# Patient Record
Sex: FEMALE | Race: WHITE | NOT HISPANIC OR LATINO | Employment: FULL TIME | URBAN - METROPOLITAN AREA
[De-identification: names, ages, dates, MRNs, and addresses within clinical notes are randomized per-mention and may not be internally consistent; named-entity substitution may affect disease eponyms.]

---

## 2022-08-09 ENCOUNTER — OFFICE VISIT (OUTPATIENT)
Dept: PHYSICAL THERAPY | Facility: CLINIC | Age: 57
End: 2022-08-09
Payer: COMMERCIAL

## 2022-08-09 DIAGNOSIS — G89.29 CHRONIC LEFT SHOULDER PAIN: ICD-10-CM

## 2022-08-09 DIAGNOSIS — M54.2 NECK PAIN: Primary | ICD-10-CM

## 2022-08-09 DIAGNOSIS — G89.29 CHRONIC RIGHT SHOULDER PAIN: ICD-10-CM

## 2022-08-09 DIAGNOSIS — M25.512 CHRONIC LEFT SHOULDER PAIN: ICD-10-CM

## 2022-08-09 DIAGNOSIS — G89.29 CHRONIC JAW PAIN: ICD-10-CM

## 2022-08-09 DIAGNOSIS — R68.84 CHRONIC JAW PAIN: ICD-10-CM

## 2022-08-09 DIAGNOSIS — M25.511 CHRONIC RIGHT SHOULDER PAIN: ICD-10-CM

## 2022-08-09 PROCEDURE — 97161 PT EVAL LOW COMPLEX 20 MIN: CPT

## 2022-08-09 PROCEDURE — 97140 MANUAL THERAPY 1/> REGIONS: CPT

## 2022-08-09 NOTE — LETTER
2022    Bryant Briceno DMD  [address] [place]    Patient: Jayme Rowell   YOB: 1965   Date of Visit: 2022     Encounter Diagnosis     ICD-10-CM    1  Neck pain  M54 2    2  Chronic left shoulder pain  M25 512     G89 29    3  Chronic right shoulder pain  M25 511     G89 29    4  Chronic jaw pain  R68 84     G89 29        Dear Dr Abundio Main: Thank you for your recent referral of [facility]  Please review the attached evaluation summary from [facility] recent visit  Please verify that you agree with the plan of care by signing the attached order  If you have any questions or concerns, please do not hesitate to call  I sincerely appreciate the opportunity to share in the care of one of your patients and hope to have another opportunity to work with you in the near future  Sincerely,    Noemí Jones, PT      Referring Provider:      I certify that I have read the below Plan of Care and certify the need for these services furnished under this plan of treatment while under my care  CADY Cohen [id number]  [address]          PT Evaluation     Today's date: 2022  Patient name: Jayme Rowell  : 1965  MRN: [id number]  Referring provider: Shlomo Interiano PT  Dx:   Encounter Diagnosis     ICD-10-CM    1  Neck pain  M54 2    2  Chronic left shoulder pain  M25 512     G89 29    3  Chronic right shoulder pain  M25 511     G89 29    4  Chronic jaw pain  R68 84     G89 29        Start Time: 0800  Stop Time: 0900  Total time in clinic (min): 60 minutes    Assessment  Assessment details: Patient is a 58 yo female presenting to her physical therapy direct access evaluation with symptoms consistent with chronic neck, shoulder and TMJ pain that started increase a few weeks ago   Patient presents with decreased cervical and TMJ ROM, decreased strength on the L UE and decreased activity tolerance  Patient has increased pain with eating, talking drive and lifting  PT will address the noted impairments by performing scapular and shoulder strengthening, stretching, balance, functional activities and manual techniques to allow the patient to return to her PLOF  PT recommended 2x/week for 4-6 weeks c a guarded prognosis 2* PLOF  Ivin Moment, SPT was present during the session  Patient provided verbal consent  Impairments: abnormal or restricted ROM, activity intolerance, impaired physical strength, lacks appropriate home exercise program, pain with function, poor posture  and poor body mechanics    Symptom irritability: moderateUnderstanding of Dx/Px/POC: good   Prognosis: good    Goals  STG: In four weeks the patient will:    1  Be (I) with her HEP  2  Increase L shoulder strength to 4+/5 MMT score to assist c ADLs  3  Increase cervical ROM by 25% to assist c driving  4  Increase mandibular opening by 10 with < 2/10 pain in the jaw  LTG: In six weeks, the patient will:    1  Increase FOTO score to 51 to demonstrate improvements in symptoms and function  2  Demonstrate full cervical AROM without pain  3  Open her mouth with < 2/10 pain in the jaw without pain  4  Increase UE strength to 5/5 MMT score to assist c prolonged activities  5  Talk with < 2/10 pain in the jaw  6  Drive with minimal pain  7  Eat a sandwich <2/10 pain in the jaw         Plan  Patient would benefit from: skilled physical therapy and PT eval  Referral necessary: Yes  Planned modality interventions: cryotherapy and thermotherapy: hydrocollator packs  Planned therapy interventions: abdominal trunk stabilization, joint mobilization, manual therapy, massage, Weber taping, balance, body mechanics training, breathing training, neuromuscular re-education, patient education, postural training, strengthening, stretching, therapeutic activities, therapeutic exercise, transfer training, home exercise program, functional ROM exercises and flexibility  Frequency: 2x week  Duration in visits: 12  Duration in weeks: 6  Plan of Care beginning date: 2022  Plan of Care expiration date: 2022  Treatment plan discussed with: patient Shweta Pressley SPT was present during the session  )        Subjective Evaluation    History of Present Illness  Mechanism of injury: Patient noted that she has clicking in both TMJ joints  Patient noted that in the past couple of months she started to notice increased pain  Patient feels that she has a constant ear ache on the L > R  Patient noted no paraesthesias  Patient went to the dentist about two weeks ago  Patient is going to get a device fitted tomorrow and will wear it 4-6 months even when she eats  Patient will then transition to a   Patient noted that her pain is constant and is hard to fall asleep  Patient is taking advil on a daily basis  Patient is able to eat everything, however she has a hard time with hard food  Patient noted that she is talking different and would have her jaw lock when opening  Patient noted that she has neck pain and shoulder pain (L > R)  Patient has increased difficulty with driving  Patient stands for a majority of the day               Recurrent probem    Quality of life: good    Pain  Current pain ratin  At best pain rating: 3  At worst pain ratin  Location: TMJ, shoulder and neck (L >R)  Quality: dull ache  Relieving factors: medications, heat and rest  Aggravating factors: standing, lifting, overhead activity and eating  Progression: worsening    Social Support  Steps to enter house: yes  1  Stairs in house: yes   Lives in: multiple-level home  Lives with: significant other and adult children    Employment status: working  Hand dominance: right  Exercise history: walking      Diagnostic Tests  X-ray: normal (cervical spine)  MRI studies: normal (cervical spine)  Patient Goals  Patient goals for therapy: increased strength, independence with ADLs/IADLs, return to sport/leisure activities, increased motion and decreased pain  Patient goal: "to decrease pain with talking and eating " "to turn her head with driving"        Objective     Palpation   Left   Hypertonic in the masseter  Tenderness of the suboccipitals and upper trapezius  Trigger point to lateral pterygoid and medial pterygoid  Right   Hypertonic in the masseter  Tenderness of the suboccipitals and upper trapezius  Trigger point to lateral pterygoid and medial pterygoid       Active Range of Motion   Cervical/Thoracic Spine       Cervical    Subcranial retraction:  with pain   Restriction level: moderate  Flexion:  Restriction level: minimal  Extension:  Restriction level: minimal  Left lateral flexion:  with pain Restriction level: maximal  Right lateral flexion:  with pain Restriction level maximal  Left rotation:  with pain Restriction level: maximal  Right rotation:  with pain Restriction level: moderate  Left Shoulder   Flexion: WFL  Abduction: WFL  External rotation BTH: T3   Internal rotation BTB: T8     Right Shoulder   Flexion: WFL  Abduction: WFL  External rotation BTH: T3   Internal rotation BTB: T8     Strength/Myotome Testing     Left Shoulder     Planes of Motion   Flexion: 4+   Abduction: 4+   External rotation at 0°: 4   Internal rotation at 0°: 4     Right Shoulder     Planes of Motion   Flexion: 4+   Abduction: 4+   External rotation at 0°: 4+   Internal rotation at 0°: 4+     Left Elbow   Flexion: 4  Extension: 4    Right Elbow   Flexion: 4+  Extension: 4+    Left Wrist/Hand   Wrist extension: 4+  Wrist flexion: 4+    Right Wrist/Hand   Wrist extension: 4+  Wrist flexion: 4+  TMJ   Jaw observations: within normal limits  Joint sounds left: clicking  Joint sounds right: clicking  Lateral bite test, Left: pain on left  Lateral bite test, right: pain on right  ROM: limited  Opening (mm): 43 (30 when talking) and left deviation   Lateral excursion, left (mm): 10 and pain  Lateral excursion, right (mm)t: 5 and pain   Protrusion (mm): 3   ROM comments:  C pattern with clicking and popping      Flowsheet Rows    Flowsheet Row Most Recent Value   PT/OT G-Codes    Current Score 43   Projected Score 51   Assessment Type Evaluation             Precautions: none      Manuals 8/9            Ptyergoid STM (B) MW            Masseter STM MW            Sub-occipital release MW            TMJ distraction MW  Grade  II            TMJ distraction + anterior mobilization MW  Grade  II            Cervical upglides and downglides MW  Grade  III            Neuro Re-Ed             Chin tuck nv            Serratus punch nv            sidelying ER nv            Posture edu nv            4 step TMJ disc recapture x5                                      Ther Ex             pec stretch nv            Open books nv            UT and levator scap stretch nv            Scalene stretch nv            Rows and extensions nv                                                   Ther Activity                                       Gait Training                                       Modalities

## 2022-08-10 ENCOUNTER — OFFICE VISIT (OUTPATIENT)
Dept: PHYSICAL THERAPY | Facility: CLINIC | Age: 57
End: 2022-08-10
Payer: COMMERCIAL

## 2022-08-10 DIAGNOSIS — M25.512 CHRONIC LEFT SHOULDER PAIN: ICD-10-CM

## 2022-08-10 DIAGNOSIS — M54.2 NECK PAIN: Primary | ICD-10-CM

## 2022-08-10 DIAGNOSIS — G89.29 CHRONIC JAW PAIN: ICD-10-CM

## 2022-08-10 DIAGNOSIS — G89.29 CHRONIC LEFT SHOULDER PAIN: ICD-10-CM

## 2022-08-10 DIAGNOSIS — M25.511 CHRONIC RIGHT SHOULDER PAIN: ICD-10-CM

## 2022-08-10 DIAGNOSIS — G89.29 CHRONIC RIGHT SHOULDER PAIN: ICD-10-CM

## 2022-08-10 DIAGNOSIS — R68.84 CHRONIC JAW PAIN: ICD-10-CM

## 2022-08-10 PROCEDURE — 97140 MANUAL THERAPY 1/> REGIONS: CPT

## 2022-08-10 PROCEDURE — 97112 NEUROMUSCULAR REEDUCATION: CPT

## 2022-08-10 NOTE — PROGRESS NOTES
Daily Note     Today's date: 8/10/2022  Patient name: Sommer Roberts  : 1965  MRN: [id number]  Referring provider: Aurelio Medina PT  Dx:   Encounter Diagnosis     ICD-10-CM    1  Neck pain  M54 2    2  Chronic left shoulder pain  M25 512     G89 29    3  Chronic right shoulder pain  M25 511     G89 29    4  Chronic jaw pain  R68 84     G89 29        Start Time: 0815  Stop Time: 0845  Total time in clinic (min): 30 minutes    Subjective: Patient noted that her jaw and neck felt better after last session  Patient noted that she continues to have the popping and clicking in her jaw  Patient noted that she will be fitted for her mouth brace today  Objective: See treatment diary below      Assessment: PT performed STM to the TMJ musculature as well as sub-occipitals to assist c pain and opening of the mandible  Patient noted tenderness, however it was less than last session  PT noted improvements in mandibular opening post manuals and re-location exercise  PT educated the patient on her HEP  Patient would benefit from continued PT to allow the patient to return to her PLOF  Plan: Continue per plan of care        Precautions: none      Manuals 8/9 8/10           Ptyergoid STM (B) MW MW           Masseter STM MW            Sub-occipital release MW MW           TMJ distraction MW  Grade  II MW  Grade  II           TMJ distraction + anterior mobilization MW  Grade  II MW  Grade  II           Cervical upglides and downglides MW  Grade  III            Neuro Re-Ed             Chin tuck nv            Serratus punch nv            sidelying ER nv            Posture edu nv MW           4 step TMJ disc recapture x5 x5                                     Ther Ex             pec stretch nv            Open books nv            UT and levator scap stretch nv            Scalene stretch nv            Rows and extensions nv                                                   Ther Activity Gait Training                                       Modalities             HP  5' beginning

## 2022-08-12 ENCOUNTER — OFFICE VISIT (OUTPATIENT)
Dept: PHYSICAL THERAPY | Facility: CLINIC | Age: 57
End: 2022-08-12
Payer: COMMERCIAL

## 2022-08-12 DIAGNOSIS — G89.29 CHRONIC LEFT SHOULDER PAIN: ICD-10-CM

## 2022-08-12 DIAGNOSIS — R68.84 CHRONIC JAW PAIN: ICD-10-CM

## 2022-08-12 DIAGNOSIS — M25.512 CHRONIC LEFT SHOULDER PAIN: ICD-10-CM

## 2022-08-12 DIAGNOSIS — G89.29 CHRONIC RIGHT SHOULDER PAIN: ICD-10-CM

## 2022-08-12 DIAGNOSIS — G89.29 CHRONIC JAW PAIN: ICD-10-CM

## 2022-08-12 DIAGNOSIS — M25.511 CHRONIC RIGHT SHOULDER PAIN: ICD-10-CM

## 2022-08-12 DIAGNOSIS — M54.2 NECK PAIN: Primary | ICD-10-CM

## 2022-08-12 PROCEDURE — 97140 MANUAL THERAPY 1/> REGIONS: CPT

## 2022-08-12 PROCEDURE — 97112 NEUROMUSCULAR REEDUCATION: CPT

## 2022-08-12 NOTE — PROGRESS NOTES
Daily Note     Today's date: 2022  Patient name: Obie Jade  : 1965  MRN: [id number]  Referring provider: Nieves Smith, PT  Dx:   Encounter Diagnosis     ICD-10-CM    1  Neck pain  M54 2    2  Chronic left shoulder pain  M25 512     G89 29    3  Chronic right shoulder pain  M25 511     G89 29    4  Chronic jaw pain  R68 84     G89 29        Start Time: 0730  Stop Time: 0800  Total time in clinic (min): 30 minutes    Subjective: Patient noted that she felt good after last PT session, however she went to the dentist and she had stim on for 45 mins and noted increased pain  Patient noted since then she has been so tight in the neck and TMJ  Objective: See treatment diary below      Assessment: PT performed STM to the neck, shoulder and TMJ musculature to assist c pain levels  Patient presented with increased trigger points  After manuals were performed, the patient noted a decrease in pain  Patient performed the disc relocation exercise with less clicking as well as the chin tucks with improved mobility  PT educated the patient on her HEP  Patient would benefit from continued PT to allow the patient to return to her PLOF  Plan: Continue per plan of care        Precautions: none      Manuals 8/9 8/10 8/12          Ptyergoid STM (B) MW MW MW          Masseter STM MW  MW          Sub-occipital release MW MW MW          TMJ distraction MW  Grade  II MW  Grade  II MW  Grade  II          TMJ distraction + anterior mobilization MW  Grade  II MW  Grade  II MW  Grade II          Cervical upglides and downglides MW  Grade  III            Neuro Re-Ed             Chin tuck nv  x10  5" hold          Serratus punch nv            sidelying ER nv            Posture edu nv MW MW + HEP edu          4 step TMJ disc recapture x5 x5 x5                                    Ther Ex             pec stretch nv            Open books nv            UT and levator scap stretch nv            Scalene stretch nv Rows and extensions nv                                                   Ther Activity                                       Gait Training                                       Modalities             HP  5' beginning

## 2022-08-22 ENCOUNTER — OFFICE VISIT (OUTPATIENT)
Dept: PHYSICAL THERAPY | Facility: CLINIC | Age: 57
End: 2022-08-22
Payer: COMMERCIAL

## 2022-08-22 DIAGNOSIS — M25.512 CHRONIC LEFT SHOULDER PAIN: ICD-10-CM

## 2022-08-22 DIAGNOSIS — M54.2 NECK PAIN: Primary | ICD-10-CM

## 2022-08-22 DIAGNOSIS — G89.29 CHRONIC LEFT SHOULDER PAIN: ICD-10-CM

## 2022-08-22 DIAGNOSIS — M25.511 CHRONIC RIGHT SHOULDER PAIN: ICD-10-CM

## 2022-08-22 DIAGNOSIS — G89.29 CHRONIC RIGHT SHOULDER PAIN: ICD-10-CM

## 2022-08-22 DIAGNOSIS — G89.29 CHRONIC JAW PAIN: ICD-10-CM

## 2022-08-22 DIAGNOSIS — R68.84 CHRONIC JAW PAIN: ICD-10-CM

## 2022-08-22 PROCEDURE — 97140 MANUAL THERAPY 1/> REGIONS: CPT

## 2022-08-22 PROCEDURE — 97112 NEUROMUSCULAR REEDUCATION: CPT

## 2022-08-22 NOTE — PROGRESS NOTES
Daily Note     Today's date: 2022  Patient name: Bhavana Madrid  : 1965  MRN: [id number]  Referring provider: Tono Cano, PT  Dx:   Encounter Diagnosis     ICD-10-CM    1  Neck pain  M54 2    2  Chronic left shoulder pain  M25 512     G89 29    3  Chronic right shoulder pain  M25 511     G89 29    4  Chronic jaw pain  R68 84     G89 29        Start Time: 0730  Stop Time: 0800  Total time in clinic (min): 30 minutes    Subjective: Patient noted a 3/10 pain in the jaw and a 8/10 pain in the neck at the start of the session  Patient noted that in general she is feeling better and is not eating hard foods  Objective: See treatment diary below      Assessment: PT performed manual techniques to the neck and jaw to assist c pain  PT noted hypomobility on the R when compared to the L side  Patient noted improvements post session  PT educated the patient on stretches and her HEP to assist c pain  Patient would benefit from continued PT to allow the patient to return to her PLOF  Plan: Continue per plan of care        Precautions: none      Manuals 8/9 8/10 8/12 8/22         Ptyergoid STM (B) MW MW MW MW         Masseter STM MW  MW MW         Sub-occipital release MW MW MW MW         TMJ distraction MW  Grade  II MW  Grade  II MW  Grade  II MW  Grade  II         TMJ distraction + anterior mobilization MW  Grade  II MW  Grade  II MW  Grade II MW  Grade II         Cervical upglides and downglides MW  Grade  III            Neuro Re-Ed             Chin tuck nv  x10  5" hold x10  5" hold         Serratus punch nv            sidelying ER nv            Posture edu nv MW MW + HEP edu MW  + HEP edu         4 step TMJ disc recapture x5 x5 x5 x5                                   Ther Ex             pec stretch nv            Open books nv            UT and levator scap stretch nv            Scalene stretch nv            Rows and extensions nv                                                   Ther Activity Gait Training                                       Modalities             HP  5' beginning

## 2022-08-26 ENCOUNTER — OFFICE VISIT (OUTPATIENT)
Dept: PHYSICAL THERAPY | Facility: CLINIC | Age: 57
End: 2022-08-26
Payer: COMMERCIAL

## 2022-08-26 DIAGNOSIS — M54.2 NECK PAIN: Primary | ICD-10-CM

## 2022-08-26 DIAGNOSIS — G89.29 CHRONIC LEFT SHOULDER PAIN: ICD-10-CM

## 2022-08-26 DIAGNOSIS — G89.29 CHRONIC RIGHT SHOULDER PAIN: ICD-10-CM

## 2022-08-26 DIAGNOSIS — M25.511 CHRONIC RIGHT SHOULDER PAIN: ICD-10-CM

## 2022-08-26 DIAGNOSIS — R68.84 CHRONIC JAW PAIN: ICD-10-CM

## 2022-08-26 DIAGNOSIS — G89.29 CHRONIC JAW PAIN: ICD-10-CM

## 2022-08-26 DIAGNOSIS — M25.512 CHRONIC LEFT SHOULDER PAIN: ICD-10-CM

## 2022-08-26 PROCEDURE — 97112 NEUROMUSCULAR REEDUCATION: CPT

## 2022-08-26 PROCEDURE — 97140 MANUAL THERAPY 1/> REGIONS: CPT

## 2022-08-26 NOTE — PROGRESS NOTES
Daily Note     Today's date: 2022  Patient name: Rishi Castañeda  : 1965  MRN: [id number]  Referring provider: Stephanie Brooke DMD  Dx:   Encounter Diagnosis     ICD-10-CM    1  Neck pain  M54 2    2  Chronic left shoulder pain  M25 512     G89 29    3  Chronic right shoulder pain  M25 511     G89 29    4  Chronic jaw pain  R68 84     G89 29        Start Time: 730  Stop Time: 805  Total time in clinic (min): 35 minutes    Subjective: Patient noted that she had 8/10 pain at the start of the session due to not sleeping the past few days  Patient noted she felt good after last session  Objective: See treatment diary below      Assessment: Patient presented with moderate trigger points and muscle tension at the start of the session  Patient had more tenderness on the L when compared to the R  PT performed STM to the cervical, TMJ and shoulder musculature to assist c pain  Patient noted some improvements post manuals  Patient performed various strengthening exercises to assist c stabilization  Patient noted fatigue  PT added to her HEP as well as educated the patient on sleeping and drinking more water to assist c pain  Patient would benefit from continued PT to allow the patient to return to her PLOF  Plan: Continue per plan of care        Precautions: none  [employer] ACCESS CODE: [id number]       Manuals 8/9 8/10 8/12 8/22 8/26        Ptyergoid STM (B) MW MW MW MW MW        Masseter STM MW  MW MW MW        Sub-occipital release MW MW MW MW MW        TMJ distraction MW  Grade  II MW  Grade  II MW  Grade  II MW  Grade  II MW  Grade  III        TMJ distraction + anterior mobilization MW  Grade  II MW  Grade  II MW  Grade II MW  Grade II MW  Grade  III        Cervical upglides and downglides MW  Grade  III            Neuro Re-Ed             Chin tuck nv  x10  5" hold x10  5" hold x10  5" hold        Serratus punch nv    x10 ea        sidelying ER nv    x10 ea        Posture edu nv MW MW + HEP edu MW  + HEP edu HEP edu  MW        4 step TMJ disc recapture x5 x5 x5 x5 x5                                  Ther Ex             pec stretch nv            Open books nv            UT and levator scap stretch nv            Scalene stretch nv            Rows and extensions nv                                                   Ther Activity                                       Gait Training                                       Modalities             HP  5' beginning

## 2022-08-29 ENCOUNTER — OFFICE VISIT (OUTPATIENT)
Dept: PHYSICAL THERAPY | Facility: CLINIC | Age: 57
End: 2022-08-29
Payer: COMMERCIAL

## 2022-08-29 DIAGNOSIS — G89.29 CHRONIC LEFT SHOULDER PAIN: ICD-10-CM

## 2022-08-29 DIAGNOSIS — G89.29 CHRONIC RIGHT SHOULDER PAIN: ICD-10-CM

## 2022-08-29 DIAGNOSIS — M25.512 CHRONIC LEFT SHOULDER PAIN: ICD-10-CM

## 2022-08-29 DIAGNOSIS — M25.511 CHRONIC RIGHT SHOULDER PAIN: ICD-10-CM

## 2022-08-29 DIAGNOSIS — M54.2 NECK PAIN: Primary | ICD-10-CM

## 2022-08-29 DIAGNOSIS — R68.84 CHRONIC JAW PAIN: ICD-10-CM

## 2022-08-29 DIAGNOSIS — G89.29 CHRONIC JAW PAIN: ICD-10-CM

## 2022-08-29 PROCEDURE — 97110 THERAPEUTIC EXERCISES: CPT

## 2022-08-29 PROCEDURE — 97112 NEUROMUSCULAR REEDUCATION: CPT

## 2022-08-29 PROCEDURE — 97140 MANUAL THERAPY 1/> REGIONS: CPT

## 2022-08-29 NOTE — PROGRESS NOTES
Daily Note     Today's date: 2022  Patient name: Jayme Rowell  : 1965  MRN: [id number]  Referring provider: Kaylynn López DMD  Dx:   Encounter Diagnosis     ICD-10-CM    1  Neck pain  M54 2    2  Chronic left shoulder pain  M25 512     G89 29    3  Chronic right shoulder pain  M25 511     G89 29    4  Chronic jaw pain  R68 84     G89 29        Start Time: 0730  Stop Time: 0800  Total time in clinic (min): 30 minutes    Subjective: Patient noted that she is in a 8/10 pain in the neck at the start of the session  Patient noted that the jaw pain has decreased a little  Objective: See treatment diary below      Assessment: Patient felt relief with cervical traction as well as manual techniques  Patient noted a decrease in pain at the end of the session  PT introduced various stretches to assist c pain  Patient required min VCs for form  PT added to her HEP  Patient would benefit from continued PT to allow the patient to return to her PLOF  Plan: Continue per plan of care        Precautions: none  [email or web address] ACCESS CODE: [id number]       Manuals 8/9 8/10 8/12 8/22 8/26 8/29       Ptyergoid STM (B) MW MW MW MW MW        Masseter STM MW  MW MW MW        Sub-occipital release MW MW MW MW MW MW       TMJ distraction MW  Grade  II MW  Grade  II MW  Grade  II MW  Grade  II MW  Grade  III        TMJ distraction + anterior mobilization MW  Grade  II MW  Grade  II MW  Grade II MW  Grade II MW  Grade  III        Cervical distraction      MW  Grade  III       First rib mobilization      MW grade  III       Cervical upglides and downglides MW  Grade  III     MW  Grade  III       Neuro Re-Ed             Chin tuck nv  x10  5" hold x10  5" hold x10  5" hold x15  5" hold       Serratus punch nv    x10 ea x20       sidelying ER nv    x10 ea x20 ea       Posture edu nv MW MW + HEP edu MW  + HEP edu HEP edu  MW HEP edu  MW       4 step TMJ disc recapture x5 x5 x5 x5 x5                                  Ther Ex             pec stretch nv            Open books nv            UT and levator scap stretch nv     UT  3x30" ea       Scalene stretch nv     Tried  pain       Rows and extensions nv            Self first rib mobilization      x10 ea with towel                                 Ther Activity                                       Gait Training                                       Modalities             HP  5' beginning

## 2022-08-31 ENCOUNTER — OFFICE VISIT (OUTPATIENT)
Dept: PHYSICAL THERAPY | Facility: CLINIC | Age: 57
End: 2022-08-31
Payer: COMMERCIAL

## 2022-08-31 DIAGNOSIS — M25.512 CHRONIC LEFT SHOULDER PAIN: ICD-10-CM

## 2022-08-31 DIAGNOSIS — R68.84 CHRONIC JAW PAIN: ICD-10-CM

## 2022-08-31 DIAGNOSIS — G89.29 CHRONIC RIGHT SHOULDER PAIN: ICD-10-CM

## 2022-08-31 DIAGNOSIS — M25.511 CHRONIC RIGHT SHOULDER PAIN: ICD-10-CM

## 2022-08-31 DIAGNOSIS — G89.29 CHRONIC LEFT SHOULDER PAIN: ICD-10-CM

## 2022-08-31 DIAGNOSIS — M54.2 NECK PAIN: Primary | ICD-10-CM

## 2022-08-31 DIAGNOSIS — G89.29 CHRONIC JAW PAIN: ICD-10-CM

## 2022-08-31 PROCEDURE — 97110 THERAPEUTIC EXERCISES: CPT

## 2022-08-31 PROCEDURE — 97112 NEUROMUSCULAR REEDUCATION: CPT

## 2022-08-31 PROCEDURE — 97140 MANUAL THERAPY 1/> REGIONS: CPT

## 2022-08-31 NOTE — PROGRESS NOTES
Daily Note     Today's date: 2022  Patient name: Kamari Christopher  : 1965  MRN: [id number]  Referring provider: Jere Mckoy, PT  Dx:   Encounter Diagnosis     ICD-10-CM    1  Neck pain  M54 2    2  Chronic left shoulder pain  M25 512     G89 29    3  Chronic right shoulder pain  M25 511     G89 29    4  Chronic jaw pain  R68 84     G89 29        Start Time: 0730  Stop Time: 0800  Total time in clinic (min): 30 minutes    Subjective: Patient that she is feeling better today than last sessions  Patient noted less pain in the jaw and neck today  Patient noted she is performing her HEP  Objective: See treatment diary below      Assessment: PT introduced the UBE, levator scap stretch and no money exercise to assist c pain  Patient required min VCs for form  Patient noted improvements post exercise and manual techniques  PT educated the patient on her HEP  Patient would benefit from continued PT to allow the patient to return to her PLOF  Plan: Continue per plan of care        Precautions: none  "[employer] ACCESS CODE: [id number]       Manuals 8/9 8/10 8/12 8/22 8/26 8/29 8/31      Ptyergoid STM (B) MW MW MW MW MW        Masseter STM MW  MW MW MW        Sub-occipital release MW MW MW MW MW MW MW + paraspinals      TMJ distraction MW  Grade  II MW  Grade  II MW  Grade  II MW  Grade  II MW  Grade  III        TMJ distraction + anterior mobilization MW  Grade  II MW  Grade  II MW  Grade II MW  Grade II MW  Grade  III        Cervical distraction      MW  Grade  III MW  Grade  III      First rib mobilization      MW grade  III       Cervical upglides and downglides MW  Grade  III     MW  Grade  III       Neuro Re-Ed             Chin tuck nv  x10  5" hold x10  5" hold x10  5" hold x15  5" hold x15  5" hold      Serratus punch nv    x10 ea x20       sidelying ER nv    x10 ea x20 ea       Posture edu nv MW MW + HEP edu MW  + HEP edu HEP edu  MW HEP edu  MW HEP edu  MW      4 step TMJ disc recapture x5 x5 x5 x5 x5        No money       PTB  x10  5" hold                   Ther Ex             UBE retro       5'      pec stretch nv            Open books nv            UT and levator scap stretch nv     UT  3x30" ea + levator scap stretch  3x30" ea      Scalene stretch nv     Tried  pain       Rows and extensions nv            Self first rib mobilization      x10 ea with towel x15 ea                                Ther Activity                                       Gait Training                                       Modalities             HP  5' beginning

## 2022-09-06 ENCOUNTER — OFFICE VISIT (OUTPATIENT)
Dept: PHYSICAL THERAPY | Facility: CLINIC | Age: 57
End: 2022-09-06
Payer: COMMERCIAL

## 2022-09-06 DIAGNOSIS — G89.29 CHRONIC RIGHT SHOULDER PAIN: ICD-10-CM

## 2022-09-06 DIAGNOSIS — G89.29 CHRONIC JAW PAIN: ICD-10-CM

## 2022-09-06 DIAGNOSIS — M25.511 CHRONIC RIGHT SHOULDER PAIN: ICD-10-CM

## 2022-09-06 DIAGNOSIS — R68.84 CHRONIC JAW PAIN: ICD-10-CM

## 2022-09-06 DIAGNOSIS — G89.29 CHRONIC LEFT SHOULDER PAIN: ICD-10-CM

## 2022-09-06 DIAGNOSIS — M54.2 NECK PAIN: Primary | ICD-10-CM

## 2022-09-06 DIAGNOSIS — M25.512 CHRONIC LEFT SHOULDER PAIN: ICD-10-CM

## 2022-09-06 PROCEDURE — 97112 NEUROMUSCULAR REEDUCATION: CPT

## 2022-09-06 PROCEDURE — 97140 MANUAL THERAPY 1/> REGIONS: CPT

## 2022-09-06 PROCEDURE — 97110 THERAPEUTIC EXERCISES: CPT

## 2022-09-06 NOTE — PROGRESS NOTES
Daily Note     Today's date: 2022  Patient name: Adelaida Story  : 1965  MRN: [id number]  Referring provider: Michelle Meade, PT  Dx:   Encounter Diagnosis     ICD-10-CM    1  Neck pain  M54 2    2  Chronic left shoulder pain  M25 512     G89 29    3  Chronic right shoulder pain  M25 511     G89 29    4  Chronic jaw pain  R68 84     G89 29        Start Time: 0730  Stop Time: 0800  Total time in clinic (min): 30 minutes    Subjective: Patient noted that she has an 8/10 of neck pain this morning  Patient noted no jaw pain at the start of the session  Patient noted she is affected by the rain with her pain  Patient noted that she planted about 7 trees yesterday  Objective: See treatment diary below      Assessment: PT performed manual techniques with improved ROM in the cervical spine with up glides and down glides  Patient noted improvements post manuals  Patient required min VCs for form throughout the session  PT educated the patient on performing the self rib mobilization throughout the day to assist c pain  Patient would benefit from continued PT to allow the patient to return to her PLOF  Plan: Continue per plan of care        Precautions: none  [employer] ACCESS CODE: [id number]       Manuals 8/9 8/10 8/12 8/22 8/26 8/29 8/31 9/6     Ptyergoid STM (B) MW MW MW MW MW        Masseter STM MW  MW MW MW        Sub-occipital release MW MW MW MW MW MW MW + paraspinals MW + paraspinals     TMJ distraction MW  Grade  II MW  Grade  II MW  Grade  II MW  Grade  II MW  Grade  III        TMJ distraction + anterior mobilization MW  Grade  II MW  Grade  II MW  Grade II MW  Grade II MW  Grade  III        Cervical distraction      MW  Grade  III MW  Grade  III MW  Grade  III     First rib mobilization      MW grade  III       Cervical upglides and downglides MW  Grade  III     MW  Grade  III       Neuro Re-Ed             Chin tuck nv  x10  5" hold x10  5" hold x10  5" hold x15  5" hold x15  5" hold x15  5" hold     Serratus punch nv    x10 ea x20  x20     sidelying ER nv    x10 ea x20 ea  x20 ea (B) 1#     Posture edu nv MW MW + HEP edu MW  + HEP edu HEP edu  MW HEP edu  MW HEP edu  MW HEP edu  MW     4 step TMJ disc recapture x5 x5 x5 x5 x5        No money       PTB  x10  5" hold                   Ther Ex             UBE retro       5'      pec stretch nv            Open books nv            UT and levator scap stretch nv     UT  3x30" ea + levator scap stretch  3x30" ea      Scalene stretch nv     Tried  pain       Rows and extensions nv            Self first rib mobilization      x10 ea with towel x15 ea x15 ea                               Ther Activity                                       Gait Training                                       Modalities             HP  5' beginning

## 2022-09-08 ENCOUNTER — APPOINTMENT (OUTPATIENT)
Dept: PHYSICAL THERAPY | Facility: CLINIC | Age: 57
End: 2022-09-08
Payer: COMMERCIAL

## 2022-09-09 ENCOUNTER — APPOINTMENT (OUTPATIENT)
Dept: PHYSICAL THERAPY | Facility: CLINIC | Age: 57
End: 2022-09-09
Payer: COMMERCIAL

## 2022-09-13 ENCOUNTER — OFFICE VISIT (OUTPATIENT)
Dept: PHYSICAL THERAPY | Facility: CLINIC | Age: 57
End: 2022-09-13
Payer: COMMERCIAL

## 2022-09-13 DIAGNOSIS — M25.512 CHRONIC LEFT SHOULDER PAIN: ICD-10-CM

## 2022-09-13 DIAGNOSIS — M25.511 CHRONIC RIGHT SHOULDER PAIN: ICD-10-CM

## 2022-09-13 DIAGNOSIS — M54.2 NECK PAIN: Primary | ICD-10-CM

## 2022-09-13 DIAGNOSIS — G89.29 CHRONIC RIGHT SHOULDER PAIN: ICD-10-CM

## 2022-09-13 DIAGNOSIS — R68.84 CHRONIC JAW PAIN: ICD-10-CM

## 2022-09-13 DIAGNOSIS — G89.29 CHRONIC JAW PAIN: ICD-10-CM

## 2022-09-13 DIAGNOSIS — G89.29 CHRONIC LEFT SHOULDER PAIN: ICD-10-CM

## 2022-09-13 PROCEDURE — 97140 MANUAL THERAPY 1/> REGIONS: CPT

## 2022-09-13 NOTE — PROGRESS NOTES
Daily Note     Today's date: 2022  Patient name: Homero Escobedo  : 1965  MRN: [id number]  Referring provider: Anatoliy Longo DMD  Dx:   Encounter Diagnosis     ICD-10-CM    1  Neck pain  M54 2    2  Chronic left shoulder pain  M25 512     G89 29    3  Chronic right shoulder pain  M25 511     G89 29    4  Chronic jaw pain  R68 84     G89 29        Start Time: 0730  Stop Time: 0800  Total time in clinic (min): 30 minutes    Subjective: Patient noted that her neck pain has been not great  Patient noted that her jaw is feeling okay, however sore  Patient noted that she did not receive her TMJ brace  Patient noted she is going on vacation next week  Patient noted that she has a 9-5pm conference call today  Objective: See treatment diary below      Assessment: PT held all neck manuals and exercises due to the patient refusing  PT performed TMJ STM and joint mobilizations to assist c pain  PT noted hypomobility and increased tenderness on the R when compared to the L  Patient noted some improvements post manuals  Patient would benefit from continued PT to allow the patient to return to her PLOF  Plan: Continue per plan of care        Precautions: none  [employer] ACCESS CODE: [id number]       Manuals 8/9 8/10 8/12 8/22 8/26 8/29 8/31 9/6 9/13    Ptyergoid STM (B) MW MW MW MW MW    MW    Masseter STM MW  MW MW MW    MW    Sub-occipital release MW MW MW MW MW MW MW + paraspinals MW + paraspinals     TMJ distraction MW  Grade  II MW  Grade  II MW  Grade  II MW  Grade  II MW  Grade  III    MW  Grade  III    TMJ distraction + anterior mobilization MW  Grade  II MW  Grade  II MW  Grade II MW  Grade II MW  Grade  III    MW  Grade  III    Cervical distraction      MW  Grade  III MW  Grade  III MW  Grade  III     First rib mobilization      MW grade  III       Cervical upglides and downglides MW  Grade  III     MW  Grade  III       Neuro Re-Ed             Chin tuck nv  x10  5" hold x10  5" hold x10  5" hold x15  5" hold x15  5" hold x15  5" hold     Serratus punch nv    x10 ea x20  x20     sidelying ER nv    x10 ea x20 ea  x20 ea (B) 1#     Posture edu nv MW MW + HEP edu MW  + HEP edu HEP edu  MW HEP edu  MW HEP edu  MW HEP edu  MW     4 step TMJ disc recapture x5 x5 x5 x5 x5        No money       PTB  x10  5" hold                   Ther Ex             UBE retro       5'      pec stretch nv            Open books nv            UT and levator scap stretch nv     UT  3x30" ea + levator scap stretch  3x30" ea      Scalene stretch nv     Tried  pain       Rows and extensions nv            Self first rib mobilization      x10 ea with towel x15 ea x15 ea                               Ther Activity                                       Gait Training                                       Modalities             HP  5' beginning

## 2024-10-24 ENCOUNTER — EVALUATION (OUTPATIENT)
Age: 59
End: 2024-10-24
Payer: COMMERCIAL

## 2024-10-24 DIAGNOSIS — H81.12 BENIGN PAROXYSMAL POSITIONAL VERTIGO OF LEFT EAR: ICD-10-CM

## 2024-10-24 DIAGNOSIS — R42 DIZZINESS: Primary | ICD-10-CM

## 2024-10-24 DIAGNOSIS — R51.9 ACUTE NONINTRACTABLE HEADACHE, UNSPECIFIED HEADACHE TYPE: ICD-10-CM

## 2024-10-24 DIAGNOSIS — M54.2 CERVICAL PAIN (NECK): ICD-10-CM

## 2024-10-24 PROCEDURE — 97163 PT EVAL HIGH COMPLEX 45 MIN: CPT

## 2024-10-24 PROCEDURE — 97110 THERAPEUTIC EXERCISES: CPT

## 2024-10-24 PROCEDURE — 97140 MANUAL THERAPY 1/> REGIONS: CPT

## 2024-10-24 NOTE — LETTER
2024    [facility]  [address]     Patient: Pati Nova   YOB: 1965   Date of Visit: 10/24/2024     Encounter Diagnosis     ICD-10-CM    1. Dizziness  R42       2. Benign paroxysmal positional vertigo of left ear  H81.12       3. Cervical pain (neck)  M54.2       4. Acute nonintractable headache, unspecified headache type  R51.9           Dear Dr. Washington:    Thank you for your recent referral of Pati Nova. Please review the attached evaluation summary from Pati's recent visit.     Please verify that you agree with the plan of care by signing the attached order.     If you have any questions or concerns, please do not hesitate to call.     I sincerely appreciate the opportunity to share in the care of one of your patients and hope to have another opportunity to work with you in the near future.       Sincerely,    Wilner Medina, PT      Referring Provider:      I certify that I have read the below Plan of Care and certify the need for these services furnished under this plan of treatment while under my care.                    [facility]  [address]   Via Fax: [phone]          PT Evaluation     Today's date: 10/24/2024  Patient name: Pati Nova  : 1965  MRN: [id number]  Referring provider: Betty Washington CRNP  Dx:   Encounter Diagnosis     ICD-10-CM    1. Dizziness  R42       2. Benign paroxysmal positional vertigo of left ear  H81.12       3. Cervical pain (neck)  M54.2       4. Acute nonintractable headache, unspecified headache type  R51.9           Start Time: 0730  Stop Time: 0830  Total time in clinic (min): 60 minutes    Assessment  Impairments: abnormal coordination, abnormal muscle firing, abnormal muscle tone, abnormal or restricted ROM, activity intolerance, impaired balance, impaired physical strength, lacks appropriate home exercise  program, pain with function and safety issue  Functional limitations: transfers, bed mobility, work duties, bending, lifting, quick movements, cervical mobility  Symptom irritability: high    Assessment details: Pati Nova is a 59 y.o. female presenting with L sided posterior canal BPPV indicative of sygns and symptoms present at initial evaluation.  Positive L Willa-Hallpike was demonstrated - canalith repositioning performed.  Primary impairments include cervical and headache pain with functional activities, deep neck flexor weakness, cervical AROM dysfunction, vestibular dysfunction.  Educated pt on anatomy and physiology of diagnosis.  Will benefit from skilled PT interventions for community reintegration, ADL management/independence, return to work/hobbies.  Provided pt with written home exercise program to be completed daily.    Understanding of Dx/Px/POC: good     Prognosis: good    Goals    Short Term Goals:  In 3 weeks, the patient will:  1. Negative Willa-Hallpike maneuver  2. Bed mobility without onset of dizziness symptoms  3. Supervision with HEP for self-care  4. Decrease dizziness intensity to no more than 3/10    Long Term Goals:  In 6 weeks, the patient will:  1. Complete work duties for 4 consecutive hours without aggravating symptoms  2. FOTO to greater than predicted value  3. Independent with HEP for self-care  4. Decrease dizziness intensity to 0/10    Plan  Patient would benefit from: skilled physical therapy  Planned modality interventions: biofeedback    Planned therapy interventions: abdominal trunk stabilization, activity modification, balance, balance/weight bearing training, behavior modification, body mechanics training, community reintegration, coordination, fine motor coordination training, flexibility, functional ROM exercises, gait training, graded activity, graded exercise, graded motor, home exercise program, work reintegration, therapeutic training, therapeutic exercise,  "therapeutic activities, stretching, strengthening, self care, postural training, patient education, neuromuscular re-education, motor coordination training, massage, manual therapy, joint mobilization, ADL training and canalith repositioning    Frequency: 1-2x week  Duration in weeks: 6  Plan of Care beginning date: 10/24/2024  Plan of Care expiration date: 12/5/2024  Treatment plan discussed with: patient        Subjective    Pain Location: headache/migraines, eyes pain  Pain Intensity: bilateral cervical region - 9/10  Dizziness Intensity: 10/10 vertigo at worst; 7/10 constant dizziness  ANGELA: started with migraines then transitioned to vertigo  DOI: about 1 month ago  Aggravating Factors: getting out of bed, rolling to the left, standing, tilting head back, turning head to fast  Alleviating Factors: medical massage, chiro, klonopin, meclizine  Living Situation:difficulty with ADLs, taking care of dogs, work duties  Constitutional S/S: vertigo, denies paresthesias  Goals: \"to not be dizzy, no vertigo\"  PLOF: works as a  - difficulty concentrating      Objective      Postural Findings:              Head Position x Protracted  Neutral  Retracted   Scapular Position  Protracted x Neutral  Retracted   Thoracic Spine  Inc Kyphosis x Neutral     Lumbar Spine  Inc Lordosis x Neutral  Dec Lordosis   Pelvis  Anterior Tilt x Neutral  Posterior Tilt   Iliac Crest  L elevated x Neutral  R elevated   Scoliotic Curvature  \"C\" Curve  \"S\" Curve     Lateral Shift  Right  Left x None       Range of Motion measurements for the Cervical Spine     Joint Motion Right:  Left:    Flexion 50%*    Extension 25%*    Rotation 50% 50%   Lateral Flexion 50% 50%   Protraction 75%    Retraction 25%    * caused dizziness    UE Myotomes:  Nerve Root Test Action RIGHT  LEFT    C3 Neck side flexion intact intact   C4 Shoulder elevation intact intact   C5 Shoulder abduction intact intact   C6 Elbow Flexion and wrist extension " "intact intact   C7 Elbow extension and wrist flexion intact intact   C8 Thumb extension and ulnar deviation intact intact   T1 Hand intrinsics intact intact       Visual:  Saccades:  Horizontal- slowed, fatigue, soreness; Vertical- slowed, fatigue, soreness  Smooth Pursuits:  slow to follow, eye fatigue and soreness    Vestibular:  Head Thrust (VOR):  worsening of symptoms, no nystagmus observed  [place]-Hallpike (posterior canal):  R- not tested; L- positive, nystagmus (canalith repositioning performed)  Roll Test (horizontal canal):  R/L - not tested         Precautions: BPPV    POC expires Unit limit Auth Expiration date PT/OT + Visit Limit?   12/5/24 BOMN Pending BOMN     Visit/Unit Tracking  AUTH Status:  Date 10/24         Used 1         Remaining             Pertinent Findings:                                                                                            Test / Measure  10/24/2024   DHI 74 (severe handicap)   L Willa-Hallpike +         Manuals 10/24            Canalith Repositioning MM  Left                                                   Neuro Re-Ed             VORx1 - horiz 3x15\"            VOR cx - horiz 3x15\"            Saccades             Pencil pushups                                                    Ther Ex             HEP review / edu 10'            UBE             UT/LS S             Cerv retr 10x3\"                                                                Ther Activity                                       Gait Training                                       Modalities                                                            "

## 2024-10-24 NOTE — PROGRESS NOTES
PT Evaluation     Today's date: 10/24/2024  Patient name: Pati Nova  : 1965  MRN: [id number]  Referring provider: Betty Washington CRNP  Dx:   Encounter Diagnosis     ICD-10-CM    1. Dizziness  R42       2. Benign paroxysmal positional vertigo of left ear  H81.12       3. Cervical pain (neck)  M54.2       4. Acute nonintractable headache, unspecified headache type  R51.9           Start Time: 0730  Stop Time: 08  Total time in clinic (min): 60 minutes    Assessment  Impairments: abnormal coordination, abnormal muscle firing, abnormal muscle tone, abnormal or restricted ROM, activity intolerance, impaired balance, impaired physical strength, lacks appropriate home exercise program, pain with function and safety issue  Functional limitations: transfers, bed mobility, work duties, bending, lifting, quick movements, cervical mobility  Symptom irritability: high    Assessment details: Pati Nova is a 59 y.o. female presenting with L sided posterior canal BPPV indicative of sygns and symptoms present at initial evaluation.  Positive L [place]-Hallpike was demonstrated - canalith repositioning performed.  Primary impairments include cervical and headache pain with functional activities, deep neck flexor weakness, cervical AROM dysfunction, vestibular dysfunction.  Educated pt on anatomy and physiology of diagnosis.  Will benefit from skilled PT interventions for community reintegration, ADL management/independence, return to work/hobbies.  Provided pt with written home exercise program to be completed daily.    Understanding of Dx/Px/POC: good     Prognosis: good    Goals    Short Term Goals:  In 3 weeks, the patient will:  1. Negative [place]-Hallpike maneuver  2. Bed mobility without onset of dizziness symptoms  3. Supervision with HEP for self-care  4. Decrease dizziness intensity to no more than 3/10    Long Term Goals:  In 6 weeks, the patient will:  1. Complete work duties for 4 consecutive hours  "without aggravating symptoms  2. FOTO to greater than predicted value  3. Independent with HEP for self-care  4. Decrease dizziness intensity to 0/10    Plan  Patient would benefit from: skilled physical therapy  Planned modality interventions: biofeedback    Planned therapy interventions: abdominal trunk stabilization, activity modification, balance, balance/weight bearing training, behavior modification, body mechanics training, community reintegration, coordination, fine motor coordination training, flexibility, functional ROM exercises, gait training, graded activity, graded exercise, graded motor, home exercise program, work reintegration, therapeutic training, therapeutic exercise, therapeutic activities, stretching, strengthening, self care, postural training, patient education, neuromuscular re-education, motor coordination training, massage, manual therapy, joint mobilization, ADL training and canalith repositioning    Frequency: 1-2x week  Duration in weeks: 6  Plan of Care beginning date: 10/24/2024  Plan of Care expiration date: 12/5/2024  Treatment plan discussed with: patient        Subjective    Pain Location: headache/migraines, eyes pain  Pain Intensity: bilateral cervical region - 9/10  Dizziness Intensity: 10/10 vertigo at worst; 7/10 constant dizziness  ANGELA: started with migraines then transitioned to vertigo  DOI: about 1 month ago  Aggravating Factors: getting out of bed, rolling to the left, standing, tilting head back, turning head to fast  Alleviating Factors: medical massage, chiro, klonopin, meclizine  Living Situation:difficulty with ADLs, taking care of dogs, work duties  Constitutional S/S: vertigo, denies paresthesias  Goals: \"to not be dizzy, no vertigo\"  PLOF: works as a  - difficulty concentrating      Objective      Postural Findings:              Head Position x Protracted  Neutral  Retracted   Scapular Position  Protracted x Neutral  Retracted   Thoracic Spine " " Inc Kyphosis x Neutral     Lumbar Spine  Inc Lordosis x Neutral  Dec Lordosis   Pelvis  Anterior Tilt x Neutral  Posterior Tilt   Iliac Crest  L elevated x Neutral  R elevated   Scoliotic Curvature  \"C\" Curve  \"S\" Curve     Lateral Shift  Right  Left x None       Range of Motion measurements for the Cervical Spine     Joint Motion Right:  Left:    Flexion 50%*    Extension 25%*    Rotation 50% 50%   Lateral Flexion 50% 50%   Protraction 75%    Retraction 25%    * caused dizziness    UE Myotomes:  Nerve Root Test Action RIGHT  LEFT    C3 Neck side flexion intact intact   C4 Shoulder elevation intact intact   C5 Shoulder abduction intact intact   C6 Elbow Flexion and wrist extension intact intact   C7 Elbow extension and wrist flexion intact intact   C8 Thumb extension and ulnar deviation intact intact   T1 Hand intrinsics intact intact       Visual:  Saccades:  Horizontal- slowed, fatigue, soreness; Vertical- slowed, fatigue, soreness  Smooth Pursuits:  slow to follow, eye fatigue and soreness    Vestibular:  Head Thrust (VOR):  worsening of symptoms, no nystagmus observed  Willa-Hallpike (posterior canal):  R- not tested; L- positive, nystagmus (canalith repositioning performed)  Roll Test (horizontal canal):  R/L - not tested         Precautions: BPPV    POC expires Unit limit Auth Expiration date PT/OT + Visit Limit?   12/5/24 BOMN Pending BOMN     Visit/Unit Tracking  AUTH Status:  Date 10/24         Used 1         Remaining             Pertinent Findings:                                                                                            Test / Measure  10/24/2024   DHI 74 (severe handicap)   L Willa-Hallpike +         Manuals 10/24            Canalith Repositioning MM  Left                                                   Neuro Re-Ed             VORx1 - horiz 3x15\"            VOR cx - horiz 3x15\"            Saccades             Pencil pushups                                                    Ther Ex       " "      HEP review / edu 10'            SENAITE             UT/EBONIE S             Cerv retr 10x3\"                                                                Ther Activity                                       Gait Training                                       Modalities                                            "

## 2024-10-28 ENCOUNTER — OFFICE VISIT (OUTPATIENT)
Age: 59
End: 2024-10-28
Payer: COMMERCIAL

## 2024-10-28 DIAGNOSIS — R42 DIZZINESS: Primary | ICD-10-CM

## 2024-10-28 DIAGNOSIS — R51.9 ACUTE NONINTRACTABLE HEADACHE, UNSPECIFIED HEADACHE TYPE: ICD-10-CM

## 2024-10-28 DIAGNOSIS — H81.12 BENIGN PAROXYSMAL POSITIONAL VERTIGO OF LEFT EAR: ICD-10-CM

## 2024-10-28 DIAGNOSIS — M54.2 CERVICAL PAIN (NECK): ICD-10-CM

## 2024-10-28 PROCEDURE — 97140 MANUAL THERAPY 1/> REGIONS: CPT

## 2024-10-28 PROCEDURE — 97112 NEUROMUSCULAR REEDUCATION: CPT

## 2024-10-28 PROCEDURE — 97110 THERAPEUTIC EXERCISES: CPT

## 2024-10-28 NOTE — PROGRESS NOTES
"Daily Note     Today's date: 10/28/2024  Patient name: Pati Nova  : 1965  MRN: [id number]  Referring provider: Betty Washington CRNP  Dx:   Encounter Diagnosis     ICD-10-CM    1. Dizziness  R42       2. Benign paroxysmal positional vertigo of left ear  H81.12       3. Cervical pain (neck)  M54.2       4. Acute nonintractable headache, unspecified headache type  R51.9           Start Time: 819  Stop Time: 858  Total time in clinic (min): 39 minutes    Subjective: Pt reports an improvement of symptoms since initial evaluation.        Objective: See treatment diary below      Assessment: Tolerated treatment well. Patient would benefit from continued PT.  Pt with negative R [place]-Hallpike.  Positive L Willa-Hallpike - canalith repositioning performed.  Mild cervical discomfort and headache symptoms noted during tx session.  Eye fatigue quickly with VOR exercises.  1:1 with Tommie Medina DPT entirety of tx.        Plan: Continue per plan of care.  Progress treatment as tolerated.       Precautions: BPPV    POC expires Unit limit Auth Expiration date PT/OT + Visit Limit?   24 BOMN Pending BOMN     Visit/Unit Tracking  AUTH Status:  Date 10/24 10/28        Used 1 2        Remaining             Pertinent Findings:                                                                                            Test / Measure  10/24/2024   DHI 74 (severe handicap)   L [place]-Hallpike +         Manuals 10/24 10/28           Canalith Repositioning MM  Left MM Left                                                  Neuro Re-Ed             VORx1 - horiz 3x15\" 3x30\"           VORx1 - vert  3x30\"           VOR cx - horiz 3x15\" 3x30\"           Saccades             Pencil pushups                                                    Ther Ex             HEP review / edu 10'            UBE  3'/3'           UT/LS S  2x30\" ea B           Cerv retr 10x3\" 15x3\"           Shrugs  + rot 2x10 5# dbs           SNAGS - rot  15x3\" B         "                             Ther Activity                                       Gait Training                                       Modalities

## 2024-10-30 ENCOUNTER — OFFICE VISIT (OUTPATIENT)
Age: 59
End: 2024-10-30
Payer: COMMERCIAL

## 2024-10-30 DIAGNOSIS — H81.12 BENIGN PAROXYSMAL POSITIONAL VERTIGO OF LEFT EAR: ICD-10-CM

## 2024-10-30 DIAGNOSIS — R42 DIZZINESS: Primary | ICD-10-CM

## 2024-10-30 DIAGNOSIS — R51.9 ACUTE NONINTRACTABLE HEADACHE, UNSPECIFIED HEADACHE TYPE: ICD-10-CM

## 2024-10-30 DIAGNOSIS — M54.2 CERVICAL PAIN (NECK): ICD-10-CM

## 2024-10-30 PROCEDURE — 97140 MANUAL THERAPY 1/> REGIONS: CPT

## 2024-10-30 PROCEDURE — 97112 NEUROMUSCULAR REEDUCATION: CPT

## 2024-10-30 PROCEDURE — 97110 THERAPEUTIC EXERCISES: CPT

## 2024-10-30 NOTE — PROGRESS NOTES
"Daily Note     Today's date: 10/30/2024  Patient name: Pati Nova  : 1965  MRN: [id number]  Referring provider: Betty Washington CRNP  Dx:   Encounter Diagnosis     ICD-10-CM    1. Dizziness  R42       2. Benign paroxysmal positional vertigo of left ear  H81.12       3. Cervical pain (neck)  M54.2       4. Acute nonintractable headache, unspecified headache type  R51.9           Start Time: 1734  Stop Time: 1800  Total time in clinic (min): 26 minutes    Subjective: Pt reports having a bad dizziness day yesterday.  Great day after PT two days ago.      Objective: See treatment diary below      Assessment: Tolerated treatment well. Patient would benefit from continued PT.  Pt with dynamic balance deficit noted this tx session, especially with head turns.  Moderate eye fatigue and headache symptoms throughout tx session.  No vertiginous symptoms noted.  Negative [place]-Hallpike and Roll Test this visit.  No canalith repositioning performed.  Educated pt on posterior canal and horizontal canal differences.  1:1 with Tommie Medina DPT entirety of tx.        Plan: Continue per plan of care.      Precautions: BPPV    POC expires Unit limit Auth Expiration date PT/OT + Visit Limit?   24 BOMN N/A BOMN     Visit/Unit Tracking  AUTH Status:  Date 10/24 10/28 10/30       Used 1 2 3       Remaining             Pertinent Findings:                                                                                            Test / Measure  10/24/2024   DHI 74 (severe handicap)   L [place]-Hallpike +         Manuals 10/24 10/28 10/30          Canalith Repositioning MM  Left MM Left           [place]-Hallpike MM L MM L MM B          Roll test   MM B                       Neuro Re-Ed             VORx1 - horiz 3x15\" 3x30\" 3x30\"          VORx1 - vert  3x30\" 3x30\"          VORx2 - horiz   3x30\"          VOR cx - horiz 3x15\" 3x30\" 3x30\"          Saccades             Pencil pushups   2x10          Gait + horiz head turns   2 laps     " "                               Ther Ex             HEP review / edu 10'            UBE  3'/3' 3'/3'          UT/LS S  2x30\" yokasta PARKER 2x30\" yokasta PARKER          Cerv retr 10x3\" 15x3\" 15x3\"          Shrugs  + rot 2x10 5# dbs + rot 2x10 10# dbs          SNAGS - rot  15x3\" B 10x10\" B                                    Ther Activity                                       Gait Training                                       Modalities                                              "

## 2024-11-04 ENCOUNTER — OFFICE VISIT (OUTPATIENT)
Age: 59
End: 2024-11-04
Payer: COMMERCIAL

## 2024-11-04 DIAGNOSIS — H81.12 BENIGN PAROXYSMAL POSITIONAL VERTIGO OF LEFT EAR: ICD-10-CM

## 2024-11-04 DIAGNOSIS — R51.9 ACUTE NONINTRACTABLE HEADACHE, UNSPECIFIED HEADACHE TYPE: ICD-10-CM

## 2024-11-04 DIAGNOSIS — M54.2 CERVICAL PAIN (NECK): ICD-10-CM

## 2024-11-04 DIAGNOSIS — R42 DIZZINESS: Primary | ICD-10-CM

## 2024-11-04 PROCEDURE — 97530 THERAPEUTIC ACTIVITIES: CPT

## 2024-11-04 PROCEDURE — 97110 THERAPEUTIC EXERCISES: CPT

## 2024-11-04 PROCEDURE — 97112 NEUROMUSCULAR REEDUCATION: CPT

## 2024-11-04 NOTE — PROGRESS NOTES
"Daily Note     Today's date: 2024  Patient name: Pati Nova  : 1965  MRN: [id number]  Referring provider: Betty Washington CRNP  Dx:   Encounter Diagnosis     ICD-10-CM    1. Dizziness  R42       2. Benign paroxysmal positional vertigo of left ear  H81.12       3. Cervical pain (neck)  M54.2       4. Acute nonintractable headache, unspecified headache type  R51.9           Start Time: 0700  Stop Time: 0745  Total time in clinic (min): 45 minutes    Subjective: Pt reports significant improvement of dizziness symptoms since last visit.  Continues to have cervical and eye soreness/fatigue/pain.        Objective: See treatment diary below      Assessment: Tolerated treatment well. Patient would benefit from continued PT.  Pt able to tolerate progression of planned therapeutic interventions this visit.  Dizziness symptoms much improved.  No need to test vestibular dysfunction this visit.  Discussed eventual transition to POC addressing cervical dysfunction.  Balance dysfunction noted in both static and dynamic balance tasks.  1:1 with Tommie Medina DPT entirety of tx.        Plan: Continue per plan of care.  Progress treatment as tolerated.       Precautions: BPPV    POC expires Unit limit Auth Expiration date PT/OT + Visit Limit?   24 BOMN N/A BOMN     Visit/Unit Tracking  AUTH Status:  Date 10/24 10/28 10/30 11/4      Used 1 2 3 4      Remaining             Pertinent Findings:                                                                                            Test / Measure  10/24/2024   DHI 74 (severe handicap)   L [place]-Hallpike +         Manuals 10/24 10/28 10/30 11/4   Canalith Repositioning MM  Left MM Left     Willa-Hallpike MM L MM L MM B    Roll test   MM B           Neuro Re-Ed       VORx1 - horiz 3x15\" 3x30\" 3x30\" Romberg stance 2x30\"  1x30\" + foam   VORx1 - vert  3x30\" 3x30\" Romberg stance 2x30\"  1x30\" + foam   VORx2 - horiz   3x30\" Romberg stance 3x30\"   VOR cx - horiz 3x15\" 3x30\" " "3x30\" Romberg stance 3x30\"   Saccades       Pencil pushups   2x10    Gait + horiz head turns   2 laps 2 laps   Gait + vert head turns    2 laps          Ther Ex       HEP review / edu 10'             UT/LS S  2x30\" ea B 2x30\" ea B 2x30\" ea B   Cerv retr 10x3\" 15x3\" 15x3\" + ext 15x   Shrugs  + rot 2x10 5# dbs + rot 2x10 10# dbs + rot  10x 10#  10x 15#   SNAGS - rot  15x3\" B 10x10\" B 10x10\" B                 Ther Activity       UBE  3'/3' 3'/3' 3'/3'   [place] carry    2 laps 15# dbs  2 laps + tandem gait 15# dbs   Gait Training                     Modalities                              "

## 2024-11-06 ENCOUNTER — OFFICE VISIT (OUTPATIENT)
Age: 59
End: 2024-11-06
Payer: COMMERCIAL

## 2024-11-06 DIAGNOSIS — H81.12 BENIGN PAROXYSMAL POSITIONAL VERTIGO OF LEFT EAR: ICD-10-CM

## 2024-11-06 DIAGNOSIS — M54.2 CERVICAL PAIN (NECK): ICD-10-CM

## 2024-11-06 DIAGNOSIS — R51.9 ACUTE NONINTRACTABLE HEADACHE, UNSPECIFIED HEADACHE TYPE: ICD-10-CM

## 2024-11-06 DIAGNOSIS — R42 DIZZINESS: Primary | ICD-10-CM

## 2024-11-06 PROCEDURE — 97530 THERAPEUTIC ACTIVITIES: CPT

## 2024-11-06 PROCEDURE — 97140 MANUAL THERAPY 1/> REGIONS: CPT

## 2024-11-06 PROCEDURE — 97110 THERAPEUTIC EXERCISES: CPT

## 2024-11-06 PROCEDURE — 97112 NEUROMUSCULAR REEDUCATION: CPT

## 2024-11-06 NOTE — PROGRESS NOTES
"Daily Note     Today's date: 2024  Patient name: Pati Nova  : 1965  MRN: [id number]  Referring provider: Betty Washington CRNP  Dx:   Encounter Diagnosis     ICD-10-CM    1. Dizziness  R42       2. Benign paroxysmal positional vertigo of left ear  H81.12       3. Cervical pain (neck)  M54.2       4. Acute nonintractable headache, unspecified headache type  R51.9           Start Time: 0700  Stop Time: 0748  Total time in clinic (min): 48 minutes    Subjective: Pt reports no instances of vertiginous symptoms over the past several days.  She does note slight exacerbation of cervical discomfort yesterday and this morning.       Objective: See treatment diary below      Assessment: Tolerated treatment well. Patient would benefit from continued PT.  Tx session focused on cervical dysfunction and balance.  Pt was most challenged with completion of wall angels.  Mild fatigue noted post-session.  No further aggravation of pain symptomology during visit.  R sided posterior cervical region benefits the most from stretching due to tightness.  Pt responded well to introduction of manual therapy.  1:1 with Tommie Medina DPT entirety of tx.        Plan: Continue per plan of care.  Progress treatment as tolerated.       Precautions: BPPV    POC expires Unit limit Auth Expiration date PT/OT + Visit Limit?   24 BOMN N/A BOMN     Visit/Unit Tracking  AUTH Status:  Date 10/24 10/28 10/30 11/4 11/     Used 1 2 3 4 5     Remaining             Pertinent Findings:                                                                                            Test / Measure  10/24/2024   DHI 74 (severe handicap)   L [place]-Hallpike +         Manuals 10/24 10/28 10/30 11/4 11/6   Canalith Repositioning MM  Left MM Left      [place]-Hallpike MM L MM L MM B     Roll test   MM B     C/s STM, SOR, distr, UT S     MM 8'   Neuro Re-Ed        VORx1 - horiz 3x15\" 3x30\" 3x30\" Romberg stance 2x30\"  1x30\" + foam    VORx1 - vert  3x30\" " "3x30\" Romberg stance 2x30\"  1x30\" + foam    VORx2 - horiz   3x30\" Romberg stance 3x30\"    VOR cx - horiz 3x15\" 3x30\" 3x30\" Romberg stance 3x30\"    Saccades        Pencil pushups   2x10     Gait + horiz head turns   2 laps 2 laps    Gait + vert head turns    2 laps    SLS     3x30\" B blue pad   DNF supine     20x3\"   Wall angels     15x3\"   Ther Ex        HEP review / edu 10'               UT/LS S  2x30\" ea B 2x30\" ea B 2x30\" ea B 2x30\" ea B on PB   Cerv retr 10x3\" 15x3\" 15x3\" + ext 15x + ext 2x10 on PB   Shrugs  + rot 2x10 5# dbs + rot 2x10 10# dbs + rot  10x 10#  10x 15# + rot 2x10 15#   SNAGS - rot  15x3\" B 10x10\" B 10x10\" B    [place] rows     2x10 13#   [place] Sh ext     2x10 12#   Ther Activity        UBE  3'/3' 3'/3' 3'/3' 3'/3'   [place] carry    2 laps 15# dbs  2 laps + tandem gait 15# dbs 2 laps 15# dbs + tandem gait   Gait Training                        Modalities                                   "

## 2024-11-11 ENCOUNTER — OFFICE VISIT (OUTPATIENT)
Age: 59
End: 2024-11-11
Payer: COMMERCIAL

## 2024-11-11 DIAGNOSIS — M54.2 CERVICAL PAIN (NECK): ICD-10-CM

## 2024-11-11 DIAGNOSIS — R51.9 ACUTE NONINTRACTABLE HEADACHE, UNSPECIFIED HEADACHE TYPE: ICD-10-CM

## 2024-11-11 DIAGNOSIS — H81.12 BENIGN PAROXYSMAL POSITIONAL VERTIGO OF LEFT EAR: ICD-10-CM

## 2024-11-11 DIAGNOSIS — R42 DIZZINESS: Primary | ICD-10-CM

## 2024-11-11 PROCEDURE — 97112 NEUROMUSCULAR REEDUCATION: CPT

## 2024-11-11 PROCEDURE — 97110 THERAPEUTIC EXERCISES: CPT

## 2024-11-11 NOTE — PROGRESS NOTES
"Daily Note     Today's date: 2024  Patient name: Pati Nova  : 1965  MRN: [id number]  Referring provider: Betty Washington CRNP  Dx:   Encounter Diagnosis     ICD-10-CM    1. Dizziness  R42       2. Benign paroxysmal positional vertigo of left ear  H81.12       3. Cervical pain (neck)  M54.2       4. Acute nonintractable headache, unspecified headache type  R51.9           Start Time: 0700  Stop Time: 0716  Total time in clinic (min): 16 minutes    Subjective: Pt reports no vertiginous symptoms since last visit.  She also notes an improvement of cervical pain.  Compliant with HEP.      Objective: See treatment diary below      Assessment: Tolerated treatment well. Patient would benefit from continued PT.  Continued to address cervical dysfunction secondary to vertiginous symptoms being resolved.  L sided UT/LS tightness noted.  Improvement of symptoms following manual therapy.  Scapular and deep neck flexor motor control is steadily improving.  1:1 with Tommie Medina DPT entirety of tx.        Plan: Continue per plan of care.  Progress treatment as tolerated.       Precautions: BPPV    POC expires Unit limit Auth Expiration date PT/OT + Visit Limit?   24 BOMN N/A BOMN     Visit/Unit Tracking  AUTH Status:  Date 10/24 10/28 10/30 11/4 11/6 11/11    Used 1 2 3 4 5 6    Remaining             Pertinent Findings:                                                                                            Test / Measure  10/24/2024   DHI 74 (severe handicap)   L [place]pike +         Manuals 10/24 10/28 10/30 11/4 11/6 11/11   Canalith Repositioning MM  Left MM Left       [place]-Hallpike MM L MM L MM B      Roll test   MM B      C/s STM, SOR, distr, UT S     MM 8' MM 8'   Neuro Re-Ed         VORx1 - horiz 3x15\" 3x30\" 3x30\" Romberg stance 2x30\"  1x30\" + foam     VORx1 - vert  3x30\" 3x30\" Romberg stance 2x30\"  1x30\" + foam     VORx2 - horiz   3x30\" Romberg stance 3x30\"     VOR cx - horiz 3x15\" 3x30\" " "3x30\" Romberg stance 3x30\"     Saccades         Pencil pushups   2x10      Gait + horiz head turns   2 laps 2 laps     Gait + vert head turns    2 laps     SLS     3x30\" B blue pad 3x30\" B blue pad   DNF supine     20x3\" 20x5\"   Wall angels     15x3\" 15x3\"   Ther Ex         HEP review / edu 10'                 UT/LS S  2x30\" ea B 2x30\" ea B 2x30\" ea B 2x30\" ea B on PB 2x30\" ea B on PB   Cerv retr 10x3\" 15x3\" 15x3\" + ext 15x + ext 2x10 on PB + ext 2x10 on PB   Shrugs  + rot 2x10 5# dbs + rot 2x10 10# dbs + rot  10x 10#  10x 15# + rot 2x10 15# + rot 2x10 15#   SNAGS - rot  15x3\" B 10x10\" B 10x10\" B     Rich rows     2x10 13# 2x15 15#   [place] Sh ext     2x10 12# 2x15 14#   Ther Activity         UBE  3'/3' 3'/3' 3'/3' 3'/3' 3'/3'   [place] carry    2 laps 15# dbs  2 laps + tandem gait 15# dbs 2 laps 15# dbs + tandem gait 2 laps 15# dbs + tandem gait   Gait Training                           Modalities                                        "

## 2024-11-13 ENCOUNTER — APPOINTMENT (OUTPATIENT)
Age: 59
End: 2024-11-13
Payer: COMMERCIAL

## 2024-11-13 ENCOUNTER — OFFICE VISIT (OUTPATIENT)
Age: 59
End: 2024-11-13
Payer: COMMERCIAL

## 2024-11-13 DIAGNOSIS — M54.2 CERVICAL PAIN (NECK): ICD-10-CM

## 2024-11-13 DIAGNOSIS — H81.12 BENIGN PAROXYSMAL POSITIONAL VERTIGO OF LEFT EAR: ICD-10-CM

## 2024-11-13 DIAGNOSIS — R42 DIZZINESS: Primary | ICD-10-CM

## 2024-11-13 DIAGNOSIS — R51.9 ACUTE NONINTRACTABLE HEADACHE, UNSPECIFIED HEADACHE TYPE: ICD-10-CM

## 2024-11-13 PROCEDURE — 97140 MANUAL THERAPY 1/> REGIONS: CPT

## 2024-11-13 PROCEDURE — 97110 THERAPEUTIC EXERCISES: CPT

## 2024-11-13 PROCEDURE — 97112 NEUROMUSCULAR REEDUCATION: CPT

## 2024-11-13 PROCEDURE — 97530 THERAPEUTIC ACTIVITIES: CPT

## 2024-11-13 NOTE — PROGRESS NOTES
"Daily Note     Today's date: 2024  Patient name: Pati Nova  : 1965  MRN: [id number]  Referring provider: Betty Washington CRNP  Dx:   Encounter Diagnosis     ICD-10-CM    1. Dizziness  R42       2. Benign paroxysmal positional vertigo of left ear  H81.12       3. Cervical pain (neck)  M54.2       4. Acute nonintractable headache, unspecified headache type  R51.9           Start Time: 719  Stop Time: 759  Total time in clinic (min): 40 minutes    Subjective: Pt reports exacerbation of cervical symptoms over the past 2 days.  Has spine/pain management appt next week for cervical pain.      Objective: See treatment diary below      Assessment: Tolerated treatment well. Patient would benefit from continued PT.  Tx session focused on cervical dysfunction.  Re-tested DHI - scored a 0 which indicates no handicap from dizziness.  Although pt does not have any dizziness, she continues to be limited with both static and dynamic balance tasks.  Single leg stance and tandem gait requires intermittent UE assistance to maintain balance.  Tx sessions will focus on cervical function and balance moving forward.  1:1 with Tommie Medina DPT entirety of tx.        Plan: Continue per plan of care.  Progress treatment as tolerated.       Precautions: BPPV    POC expires Unit limit Auth Expiration date PT/OT + Visit Limit?   24 BOMN N/A BOMN     Visit/Unit Tracking  AUTH Status:  Date 10/24 10/28 10/30 11/4 11/6 11/11 11/13    Used 1 2 3 4 5 6 7    Remaining              Pertinent Findings:                                                                                            Test / Measure  10/24/2024 2024   DHI 74 (severe handicap) 0   L [place]-Hallpike + -         Manuals 10/28 10/30 11/4 11/6    Canalith Repositioning MM Left        Willa-Hallpike MM L MM B       Roll test  MM B       C/s STM, SOR, distr, UT S    MM 8' MM 8' MM 8'   Neuro Re-Ed         VORx1 - horiz 3x30\" 3x30\" Romberg stance " "2x30\"  1x30\" + foam      VORx1 - vert 3x30\" 3x30\" Romberg stance 2x30\"  1x30\" + foam      VORx2 - horiz  3x30\" Romberg stance 3x30\"      VOR cx - horiz 3x30\" 3x30\" Romberg stance 3x30\"      Saccades         Pencil pushups  2x10       Gait + horiz head turns  2 laps 2 laps      Gait + vert head turns   2 laps      SLS    3x30\" B blue pad 3x30\" B blue pad 3x30\" B blue pad   DNF supine    20x3\" 20x5\" 20x5\"   Wall angels    15x3\" 15x3\"    Tandem gait + foam beam      4 laps   Ther Ex         HEP review / edu                  UT/LS S 2x30\" ea B 2x30\" ea B 2x30\" ea B 2x30\" ea B on PB 2x30\" ea B on PB 3x30\" ea B on PB   Cerv retr 15x3\" 15x3\" + ext 15x + ext 2x10 on PB + ext 2x10 on PB + ext 2x10 on PB   Shrugs + rot 2x10 5# dbs + rot 2x10 10# dbs + rot  10x 10#  10x 15# + rot 2x10 15# + rot 2x10 15# + rot 2x10 15#   SNAGS - rot 15x3\" B 10x10\" B 10x10\" B   10x10\" B   Rich rows    2x10 13# 2x15 15# 2x15 15#   [place] Sh ext    2x10 12# 2x15 14# 2x15 14#   Ther Activity         UBE 3'/3' 3'/3' 3'/3' 3'/3' 3'/3' 3'/3'   [place] carry   2 laps 15# dbs  2 laps + tandem gait 15# dbs 2 laps 15# dbs + tandem gait 2 laps 15# dbs + tandem gait    Gait Training                           Modalities                                          "

## 2024-11-18 ENCOUNTER — OFFICE VISIT (OUTPATIENT)
Age: 59
End: 2024-11-18
Payer: COMMERCIAL

## 2024-11-18 DIAGNOSIS — M54.2 CERVICAL PAIN (NECK): ICD-10-CM

## 2024-11-18 DIAGNOSIS — R42 DIZZINESS: Primary | ICD-10-CM

## 2024-11-18 DIAGNOSIS — H81.12 BENIGN PAROXYSMAL POSITIONAL VERTIGO OF LEFT EAR: ICD-10-CM

## 2024-11-18 DIAGNOSIS — R51.9 ACUTE NONINTRACTABLE HEADACHE, UNSPECIFIED HEADACHE TYPE: ICD-10-CM

## 2024-11-18 PROCEDURE — 97110 THERAPEUTIC EXERCISES: CPT

## 2024-11-18 PROCEDURE — 97112 NEUROMUSCULAR REEDUCATION: CPT

## 2024-11-18 NOTE — PROGRESS NOTES
"Daily Note     Today's date: 2024  Patient name: Pati Nova  : 1965  MRN: [id number]  Referring provider: Betty Washington CRNP  Dx:   Encounter Diagnosis     ICD-10-CM    1. Dizziness  R42       2. Benign paroxysmal positional vertigo of left ear  H81.12       3. Cervical pain (neck)  M54.2       4. Acute nonintractable headache, unspecified headache type  R51.9           Start Time: 07  Stop Time: 0753  Total time in clinic (min): 16 minutes    Subjective: Pt reports her neck is feeling the best it has in a long time.  She does note that cerv retr + ext aggravates pain symptoms.      Objective: See treatment diary below      Assessment: Tolerated treatment well. Patient would benefit from continued PT.  Held cerv retr + ext this visit - added in resisted cerv retr with band.  Pt with continued deep neck flexor and scapular motor control improvement.  Manual therapy and self-stretching continue to partially alleviate symptoms.  1:1 with Tommie Medina DPT entirety of tx.        Plan: Continue per plan of care.  Progress treatment as tolerated.       Precautions: BPPV    POC expires Unit limit Auth Expiration date PT/OT + Visit Limit?   24 BOMN N/A BOMN     Visit/Unit Tracking  AUTH Status:  Date 10/28 10/30 11/4 11/6 11/11 11/13 11/18    Used 2 3 4 5 6 7 8    Remaining              Pertinent Findings:                                                                                            Test / Measure  10/24/2024 2024   DHI 74 (severe handicap) 0   L [place]-Hallpike + -         Manuals 10/30 11/4 11/6 11/11 11/13 11/18   Canalith Repositioning         Willa-Hallpike MM B        Roll test MM B        C/s STM, SOR, distr, UT S   MM 8' MM 8' MM 8' MM 8'   Neuro Re-Ed         VORx1 - horiz 3x30\" Romberg stance 2x30\"  1x30\" + foam       VORx1 - vert 3x30\" Romberg stance 2x30\"  1x30\" + foam       VORx2 - horiz 3x30\" Romberg stance 3x30\"       VOR cx - horiz 3x30\" Romberg stance 3x30\"     " "  Saccades         Pencil pushups 2x10        Gait + horiz head turns 2 laps 2 laps       Gait + vert head turns  2 laps       SLS   3x30\" B blue pad 3x30\" B blue pad 3x30\" B blue pad 3x30\" B blue pad   DNF supine   20x3\" 20x5\" 20x5\" 20x5\"   Wall angels   15x3\" 15x3\"     Tandem gait + foam beam     4 laps    Open books      15x ea   Ther Ex         HEP review / edu                  UT/LS S 2x30\" ea B 2x30\" ea B 2x30\" ea B on PB 2x30\" ea B on PB 3x30\" ea B on PB 3x30\" ea B on PB   Cerv retr 15x3\" + ext 15x + ext 2x10 on PB + ext 2x10 on PB + ext 2x10 on PB 30x3\" on PB + gtb   Shrugs + rot 2x10 10# dbs + rot  10x 10#  10x 15# + rot 2x10 15# + rot 2x10 15# + rot 2x10 15# + rot  10x 15#  10x 20#   SNAGS - rot 10x10\" B 10x10\" B   10x10\" B 10x10\" B   [place] rows   2x10 13# 2x15 15# 2x15 15# 2x15 17#   Rich Sh ext   2x10 12# 2x15 14# 2x15 14# 2x15 15#   Facepulls      2x10 11#   Ther Activity         UBE 3'/3' 3'/3' 3'/3' 3'/3' 3'/3' 3'/3' L2   [place] carry  2 laps 15# dbs  2 laps + tandem gait 15# dbs 2 laps 15# dbs + tandem gait 2 laps 15# dbs + tandem gait     Gait Training                           Modalities                                            "

## 2024-11-20 ENCOUNTER — OFFICE VISIT (OUTPATIENT)
Age: 59
End: 2024-11-20
Payer: COMMERCIAL

## 2024-11-20 DIAGNOSIS — R51.9 ACUTE NONINTRACTABLE HEADACHE, UNSPECIFIED HEADACHE TYPE: ICD-10-CM

## 2024-11-20 DIAGNOSIS — R42 DIZZINESS: Primary | ICD-10-CM

## 2024-11-20 DIAGNOSIS — H81.12 BENIGN PAROXYSMAL POSITIONAL VERTIGO OF LEFT EAR: ICD-10-CM

## 2024-11-20 DIAGNOSIS — M54.2 CERVICAL PAIN (NECK): ICD-10-CM

## 2024-11-20 PROCEDURE — 97112 NEUROMUSCULAR REEDUCATION: CPT

## 2024-11-20 PROCEDURE — 97530 THERAPEUTIC ACTIVITIES: CPT

## 2024-11-20 PROCEDURE — 97140 MANUAL THERAPY 1/> REGIONS: CPT

## 2024-11-20 PROCEDURE — 97110 THERAPEUTIC EXERCISES: CPT

## 2024-11-20 NOTE — PROGRESS NOTES
"Daily Note     Today's date: 2024  Patient name: Pati Nova  : 1965  MRN: [id number]  Referring provider: Betty Washington CRNP  Dx:   Encounter Diagnosis     ICD-10-CM    1. Dizziness  R42       2. Benign paroxysmal positional vertigo of left ear  H81.12       3. Cervical pain (neck)  M54.2       4. Acute nonintractable headache, unspecified headache type  R51.9           Start Time: 07  Stop Time: 825  Total time in clinic (min): 55 minutes    Subjective: Pt reports slight exacerbation of cervical pain symptoms over the past 2 days.  Went to spine/pain - opted for injection in the near future.        Objective: See treatment diary below      Assessment: Tolerated treatment well. Patient would benefit from continued PT.  Able to slightly progress planned therapeutic interventions this visit.  Improving static/dynamic balance but remains limited at this time.  Deep neck flexor and scapular motor control are improving as well.  No pain aggravation post-visit.  1:1 with Tommie Medina DPT entirety of tx.        Plan: Continue per plan of care.  Progress treatment as tolerated.       Precautions: BPPV    POC expires Unit limit Auth Expiration date PT/OT + Visit Limit?   24 BOMN N/A BOMN     Visit/Unit Tracking  AUTH Status:  Date     Used 4 5 6 7 8 9    Remaining             Pertinent Findings:                                                                                            Test / Measure  10/24/2024 2024   DHI 74 (severe handicap) 0   L [place]-Hallpike + -         Manuals    Canalith Repositioning         Willa-Hallpike         Roll test         C/s STM, SOR, distr, UT S  MM 8' MM 8' MM 8' MM 8' MM 8'   Neuro Re-Ed         VORx1 - horiz Romberg stance 2x30\"  1x30\" + foam        VORx1 - vert Romberg stance 2x30\"  1x30\" + foam        VORx2 - horiz Romberg stance 3x30\"        VOR cx - horiz Romberg stance 3x30\"      " "  Gait + horiz head turns 2 laps        Gait + vert head turns 2 laps        SLS  3x30\" B blue pad 3x30\" B blue pad 3x30\" B blue pad 3x30\" B blue pad 3x30\" B gray pad    2x30\" B EC   DNF supine  20x3\" 20x5\" 20x5\" 20x5\" 15x10\"   Wall angels  15x3\" 15x3\"      Tandem gait + foam beam    4 laps  4 laps   Open books     15x ea 15x ea   Ther Ex         HEP review / edu         UT/LS S 2x30\" ea B 2x30\" ea B on PB 2x30\" ea B on PB 3x30\" ea B on PB 3x30\" ea B on PB 3x30\" ea B on PB   Cerv retr + ext 15x + ext 2x10 on PB + ext 2x10 on PB + ext 2x10 on PB 30x3\" on PB + gtb 30x3\" on PB + gtb   Shrugs + rot  10x 10#  10x 15# + rot 2x10 15# + rot 2x10 15# + rot 2x10 15# + rot  10x 15#  10x 20# + rot 2x10 20#   SNAGS - rot 10x10\" B   10x10\" B 10x10\" B 10x10\" B   Rich rows  2x10 13# 2x15 15# 2x15 15# 2x15 17# 2x15 19#   Rich Sh ext  2x10 12# 2x15 14# 2x15 14# 2x15 15# 2x15 17#   Facepulls     2x10 11# 2x10 14#   Ther Activity         UBE 3'/3' 3'/3' 3'/3' 3'/3' 3'/3' L2 3'/3' L2   [place] carry 2 laps 15# dbs  2 laps + tandem gait 15# dbs 2 laps 15# dbs + tandem gait 2 laps 15# dbs + tandem gait      Gait Training                           Modalities                                              "

## 2024-11-25 ENCOUNTER — OFFICE VISIT (OUTPATIENT)
Age: 59
End: 2024-11-25
Payer: COMMERCIAL

## 2024-11-25 DIAGNOSIS — M54.2 CERVICAL PAIN (NECK): ICD-10-CM

## 2024-11-25 DIAGNOSIS — H81.12 BENIGN PAROXYSMAL POSITIONAL VERTIGO OF LEFT EAR: ICD-10-CM

## 2024-11-25 DIAGNOSIS — R42 DIZZINESS: Primary | ICD-10-CM

## 2024-11-25 DIAGNOSIS — R51.9 ACUTE NONINTRACTABLE HEADACHE, UNSPECIFIED HEADACHE TYPE: ICD-10-CM

## 2024-11-25 PROCEDURE — 97140 MANUAL THERAPY 1/> REGIONS: CPT

## 2024-11-25 PROCEDURE — 97112 NEUROMUSCULAR REEDUCATION: CPT

## 2024-11-25 PROCEDURE — 97110 THERAPEUTIC EXERCISES: CPT

## 2024-11-25 NOTE — PROGRESS NOTES
"Daily Note     Today's date: 2024  Patient name: Pati Nova  : 1965  MRN: [id number]  Referring provider: Betty Washington CRNP  Dx:   Encounter Diagnosis     ICD-10-CM    1. Dizziness  R42       2. Benign paroxysmal positional vertigo of left ear  H81.12       3. Cervical pain (neck)  M54.2       4. Acute nonintractable headache, unspecified headache type  R51.9           Start Time: 724  Stop Time: 748  Total time in clinic (min): 24 minutes    Subjective: Pt reports \"tweaking\" low back over the weekend picking up 21 lb baby repetitively.  States slight improvement in cervical symptoms.      Objective: See treatment diary below      Assessment: Tolerated treatment well. Patient would benefit from continued PT.  Good tolerance to progression of planned therapeutic interventions this visit.  Hindered with lumbar pain this visit.  Continue to address cervical and scapular motor control.  1:1 with Tommie Medina DPT entirety of tx.        Plan: Continue per plan of care.  Progress treatment as tolerated.       Precautions: BPPV    POC expires Unit limit Auth Expiration date PT/OT + Visit Limit?   24 BOMN N/A BOMN     Visit/Unit Tracking  AUTH Status:  Date     Used 5 6 7 8 9 10    Remaining             Pertinent Findings:                                                                                            Test / Measure  10/24/2024 2024   DHI 74 (severe handicap) 0   L [place]-Hallpike + -         Manuals    Canalith Repositioning        Willa-Hallpike        Roll test        C/s STM, SOR, distr, UT S MM 8' MM 8' MM 8' MM 8' MM 8'   Neuro Re-Ed        SLS 3x30\" B blue pad 3x30\" B blue pad 3x30\" B blue pad 3x30\" B gray pad    2x30\" B EC 3x30\" B gray pad   DNF supine 20x5\" 20x5\" 20x5\" 15x10\" 15x10\"   Wall angels 15x3\"       Tandem gait + foam beam  4 laps  4 laps    Open books   15x ea 15x ea 15x ea   LTRs     15x ea   Seated " "T/s ext self-mobs     15x3\"   Ther Ex        HEP review / edu        UT/LS S 2x30\" ea B on PB 3x30\" ea B on PB 3x30\" ea B on PB 3x30\" ea B on PB    Cerv retr + ext 2x10 on PB + ext 2x10 on PB 30x3\" on PB + gtb 30x3\" on PB + gtb    Shrugs + rot 2x10 15# + rot 2x10 15# + rot  10x 15#  10x 20# + rot 2x10 20# + rot 2x10 20#   SNAGS - rot  10x10\" B 10x10\" B 10x10\" B    [place] rows 2x15 15# 2x15 15# 2x15 17# 2x15 19# 2x15 21#   Rich Sh ext 2x15 14# 2x15 14# 2x15 15# 2x15 17# 2x15 18#   Facepulls   2x10 11# 2x10 14# 2x15 16#   Ther Activity        UBE 3'/3' 3'/3' 3'/3' L2 3'/3' L2 3'/3' L2   [place] carry 2 laps 15# dbs + tandem gait       Gait Training                        Modalities                                             "

## 2024-11-27 ENCOUNTER — OFFICE VISIT (OUTPATIENT)
Age: 59
End: 2024-11-27
Payer: COMMERCIAL

## 2024-11-27 DIAGNOSIS — M54.2 CERVICAL PAIN (NECK): ICD-10-CM

## 2024-11-27 DIAGNOSIS — R42 DIZZINESS: Primary | ICD-10-CM

## 2024-11-27 DIAGNOSIS — R51.9 ACUTE NONINTRACTABLE HEADACHE, UNSPECIFIED HEADACHE TYPE: ICD-10-CM

## 2024-11-27 DIAGNOSIS — H81.12 BENIGN PAROXYSMAL POSITIONAL VERTIGO OF LEFT EAR: ICD-10-CM

## 2024-11-27 PROCEDURE — 97140 MANUAL THERAPY 1/> REGIONS: CPT

## 2024-11-27 PROCEDURE — 97110 THERAPEUTIC EXERCISES: CPT

## 2024-11-27 PROCEDURE — 97112 NEUROMUSCULAR REEDUCATION: CPT

## 2024-11-27 PROCEDURE — 97530 THERAPEUTIC ACTIVITIES: CPT

## 2024-11-27 NOTE — PROGRESS NOTES
"Daily Note     Today's date: 2024  Patient name: Pati Nova  : 1965  MRN: [id number]  Referring provider: Betty Washington CRNP  Dx:   Encounter Diagnosis     ICD-10-CM    1. Dizziness  R42       2. Benign paroxysmal positional vertigo of left ear  H81.12       3. Cervical pain (neck)  M54.2       4. Acute nonintractable headache, unspecified headache type  R51.9           Start Time: 719  Stop Time: 757  Total time in clinic (min): 38 minutes    Subjective: Pt reports continued improvement of cervical pain.  States lumbar pain is mostly resolved as well.  Mild Sh soreness but attributes that to increased activity.  Went to chiro and massage earlier this week.        Objective: See treatment diary below      Assessment: Tolerated treatment well. Patient would benefit from continued PT.  Pt with improving scapular motor control but remains slightly limited at this time - difficulty performing low trap setting due to weakness and motor control deficit.  UT/LS soreness persists.  Mild improvement of symptoms with manual therapy.   Deep neck flexor endurance is gradually improving.  1:1 with Tommie Medina DPT entirety of tx.        Plan: Continue per plan of care.  Progress treatment as tolerated.       Precautions: BPPV    POC expires Unit limit Auth Expiration date PT/OT + Visit Limit?   24 BOMN N/A BOMN     Visit/Unit Tracking  AUTH Status:  Date     Used 6 7 8 9 10 11    Remaining             Pertinent Findings:                                                                                            Test / Measure  10/24/2024 2024   DHI 74 (severe handicap) 0   L [place]-Hallpike + -         Manuals    Canalith Repositioning         [place]-Hallpike         Roll test         C/s STM, SOR, distr, UT S MM 8' MM 8' MM 8' MM 8' MM 8' MM 8'   Neuro Re-Ed         SLS 3x30\" B blue pad 3x30\" B blue pad 3x30\" B blue pad 3x30\" B " "gray pad    2x30\" B EC 3x30\" B gray pad 3x30\" B gray pad   DNF supine 20x5\" 20x5\" 20x5\" 15x10\" 15x10\" 10x15\"   Wall angels 15x3\"        Tandem gait + foam beam  4 laps  4 laps     Open books   15x ea 15x ea 15x ea 15x ea gtb   LTRs     15x ea    Seated T/s ext self-mobs     15x3\" 15x3\"   Low trap setting a wall      15x3\"   Ther Ex         HEP review / edu         UT/LS S 2x30\" ea B on PB 3x30\" ea B on PB 3x30\" ea B on PB 3x30\" ea B on PB     Cerv retr + ext 2x10 on PB + ext 2x10 on PB 30x3\" on PB + gtb 30x3\" on PB + gtb     Shrugs + rot 2x10 15# + rot 2x10 15# + rot  10x 15#  10x 20# + rot 2x10 20# + rot 2x10 20# + rot 2x10 20#   SNAGS - rot  10x10\" B 10x10\" B 10x10\" B     [place] rows 2x15 15# 2x15 15# 2x15 17# 2x15 19# 2x15 21# 2x15 22#   Rich Sh ext 2x15 14# 2x15 14# 2x15 15# 2x15 17# 2x15 18# 2x15 19#   Facepulls   2x10 11# 2x10 14# 2x15 16# 2x15 17#   Ther Activity         UBE 3'/3' 3'/3' 3'/3' L2 3'/3' L2 3'/3' L2 3'/3' L3   [place] carry 2 laps 15# dbs + tandem gait     3 laps 25# dbs + UT active S   Gait Training                           Modalities                                                  "

## 2024-12-03 ENCOUNTER — APPOINTMENT (OUTPATIENT)
Age: 59
End: 2024-12-03
Payer: COMMERCIAL

## 2024-12-05 ENCOUNTER — EVALUATION (OUTPATIENT)
Age: 59
End: 2024-12-05
Payer: COMMERCIAL

## 2024-12-05 DIAGNOSIS — R42 DIZZINESS: Primary | ICD-10-CM

## 2024-12-05 DIAGNOSIS — H81.12 BENIGN PAROXYSMAL POSITIONAL VERTIGO OF LEFT EAR: ICD-10-CM

## 2024-12-05 DIAGNOSIS — R51.9 ACUTE NONINTRACTABLE HEADACHE, UNSPECIFIED HEADACHE TYPE: ICD-10-CM

## 2024-12-05 DIAGNOSIS — M54.2 CERVICAL PAIN (NECK): ICD-10-CM

## 2024-12-05 PROCEDURE — 97112 NEUROMUSCULAR REEDUCATION: CPT

## 2024-12-05 PROCEDURE — 97140 MANUAL THERAPY 1/> REGIONS: CPT

## 2024-12-05 PROCEDURE — 97110 THERAPEUTIC EXERCISES: CPT

## 2024-12-05 PROCEDURE — 97164 PT RE-EVAL EST PLAN CARE: CPT

## 2024-12-05 NOTE — PROGRESS NOTES
PT Re-Evaluation     Today's date: 2024  Patient name: Pati Nova  : 1965  MRN: [id number]  Referring provider: Betty Washington CRNP  Dx:   Encounter Diagnosis     ICD-10-CM    1. Dizziness  R42       2. Benign paroxysmal positional vertigo of left ear  H81.12       3. Cervical pain (neck)  M54.2       4. Acute nonintractable headache, unspecified headache type  R51.9           Start Time: 0800  Stop Time: 08  Total time in clinic (min): 30 minutes    Subjective: Pt scheduled for cervical injection on .  All vertiginous symptoms have resolved.  Pt states that she is able to complete all home/work tasks but continues to have pain/discomfort.  States cervical rehab status prior to eye surgery was about 80%, but now it feels about 50% due to feeling stressed out about the procedure.  Stiffness, tightness, and pain are aggravated since the surgery.  Cervical pain up to 7/10.  Pt goal is to be pain-free and keep up with PT for preventative measures.  Unable to perform cervical flexion or lift weights for the next week due to eye surgery.      Objective: See treatment diary below    Range of Motion measurements for the Cervical Spine     Joint Motion Right:  Left:    Flexion 50%    Extension 50%    Rotation 75% 75%   Lateral Flexion 50% 50%   Protraction 75%    Retraction 50%        UE Myotomes:  Nerve Root Test Action RIGHT  LEFT    C3 Neck side flexion intact intact   C4 Shoulder elevation intact intact   C5 Shoulder abduction intact intact   C6 Elbow Flexion and wrist extension intact intact   C7 Elbow extension and wrist flexion intact intact   C8 Thumb extension and ulnar deviation intact intact   T1 Hand intrinsics intact intact         Assessment: Pt with resolved BPPV after several tx sessions.  Continuing to address cervical dysfunction.  Pt scheduled for injection next week.  Overall, improvement noted with cervical mobility.  Pain intensity was much improved but was recently  exacerbated with surgery.  Working towards long-term cervical dysfunction goals currently.  All BPPV goals have been met.  Tolerated treatment well. Patient would benefit from continued PT.  1:1 with Tommie Medina DPT entirety of tx.    Impairments: abnormal coordination, abnormal muscle firing, abnormal muscle tone, abnormal or restricted ROM, activity intolerance, impaired balance, impaired physical strength, lacks appropriate home exercise program, pain with function and safety issue  Functional limitations: transfers, bed mobility, work duties, bending, lifting, quick movements, cervical mobility  Symptom irritability: high    Initial Evaluation Assessment details: Pati Nova is a 59 y.o. female presenting with L sided posterior canal BPPV indicative of sygns and symptoms present at initial evaluation.  Positive L [place]-Hallpike was demonstrated - canalith repositioning performed.  Primary impairments include cervical and headache pain with functional activities, deep neck flexor weakness, cervical AROM dysfunction, vestibular dysfunction.  Educated pt on anatomy and physiology of diagnosis.  Will benefit from skilled PT interventions for community reintegration, ADL management/independence, return to work/hobbies.  Provided pt with written home exercise program to be completed daily.    Understanding of Dx/Px/POC: good     Prognosis: good    Goals    Short Term Goals:  1. Negative [place]-Hallpike maneuver - MET  2. Bed mobility without onset of dizziness symptoms - MET  3. Supervision with HEP for self-care - MET  4. Decrease dizziness intensity to no more than 3/10 - MET    Long Term Goals:  1. Complete work duties for 4 consecutive hours without aggravating symptoms - ONGOING  2. DHI to greater than predicted value - MET  3. Independent with HEP for self-care - ONGOING  4. Decrease dizziness intensity to 0/10 - ONGOING  5. Decrease pain intensity to no more than 2/10 - ONGOING  6. Improve cervical mobility in  "all planes of motion to at least 75% - ONGOING    Plan: Continue per plan of care.  Progress treatment as tolerated.      Patient would benefit from: skilled physical therapy  Planned modality interventions: biofeedback    Planned therapy interventions: abdominal trunk stabilization, activity modification, balance, balance/weight bearing training, behavior modification, body mechanics training, community reintegration, coordination, fine motor coordination training, flexibility, functional ROM exercises, gait training, graded activity, graded exercise, graded motor, home exercise program, work reintegration, therapeutic training, therapeutic exercise, therapeutic activities, stretching, strengthening, self care, postural training, patient education, neuromuscular re-education, motor coordination training, massage, manual therapy, joint mobilization, ADL training and canalith repositioning    Frequency: 1-2x week  Duration in weeks: 6  Plan of Care beginning date: 12/5/2024  Plan of Care expiration date: 1/16/2025  Treatment plan discussed with: patient     Precautions: BPPV    POC expires Unit limit Auth Expiration date PT/OT + Visit Limit?   12/5/24 BOMN N/A BOMN     Visit/Unit Tracking  AUTH Status:  Date 11/13 11/18 11/20 11/25 11/27 12/5    Used 7 8 9 10 11 12    Remaining             Pertinent Findings:                                                                                            Test / Measure  10/24/2024 11/13/2024 12/5/2024   DHI 74 (severe handicap) 0 0   L [place]-Hallpike + - -   Cerv flexion AROM 50%  50%   Cerv extension AROM 25%  50%         Manuals 11/13 11/18 11/20 11/25 11/27 12/5   Canalith Repositioning         [place]-Hallpike         Roll test         C/s STM, SOR, distr, UT S MM 8' MM 8' MM 8' MM 8' MM 8' MM 8'   Neuro Re-Ed         SLS 3x30\" B blue pad 3x30\" B blue pad 3x30\" B gray pad    2x30\" B EC 3x30\" B gray pad 3x30\" B gray pad 3x30\" B gray pad   DNF supine 20x5\" 20x5\" 15x10\" 15x10\" " "10x15\" 15x15\"   Wall angels         Tandem gait + foam beam 4 laps  4 laps      Open books  15x ea 15x ea 15x ea 15x ea gtb 15x ea   LTRs    15x ea     Seated T/s ext self-mobs    15x3\" 15x3\" 20x3\"   Low trap setting a wall     15x3\" 20x3\"   Ther Ex         HEP review / edu         UT/LS S 3x30\" ea B on PB 3x30\" ea B on PB 3x30\" ea B on PB   3x30\" ea B on PB   Cerv retr + ext 2x10 on PB 30x3\" on PB + gtb 30x3\" on PB + gtb      Shrugs + rot 2x10 15# + rot  10x 15#  10x 20# + rot 2x10 20# + rot 2x10 20# + rot 2x10 20# Sh rolls fwd/bwd 20x ea   SNAGS - rot 10x10\" B 10x10\" B 10x10\" B      Rich rows 2x15 15# 2x15 17# 2x15 19# 2x15 21# 2x15 22#    [place] Sh ext 2x15 14# 2x15 15# 2x15 17# 2x15 18# 2x15 19#    Facepulls  2x10 11# 2x10 14# 2x15 16# 2x15 17#    Ther Activity         UBE 3'/3' 3'/3' L2 3'/3' L2 3'/3' L2 3'/3' L3 4'/4' L3   [place] carry     3 laps 25# dbs + UT active S    Gait Training                           Modalities                                                    "

## 2024-12-05 NOTE — LETTER
2024    JOAQUÍN Box  [address] [facility]     Patient: Pati Nova   YOB: 1965   Date of Visit: 2024     Encounter Diagnosis     ICD-10-CM    1. Dizziness  R42       2. Benign paroxysmal positional vertigo of left ear  H81.12       3. Cervical pain (neck)  M54.2       4. Acute nonintractable headache, unspecified headache type  R51.9           Dear Dr. Washingtno:    Thank you for your recent referral of Pati Nova. Please review the attached evaluation summary from Pati's recent visit.     Please verify that you agree with the plan of care by signing the attached order.     If you have any questions or concerns, please do not hesitate to call.     I sincerely appreciate the opportunity to share in the care of one of your patients and hope to have another opportunity to work with you in the near future.       Sincerely,    Wilner Medina, PT      Referring Provider:      I certify that I have read the below Plan of Care and certify the need for these services furnished under this plan of treatment while under my care.                    JOAQUÍN Box  [address] [facility]   Via Fax: [phone]          PT Re-Evaluation     Today's date: 2024  Patient name: Pati Nova  : 1965  MRN: [id number]  Referring provider: Betty Washington CRNP  Dx:   Encounter Diagnosis     ICD-10-CM    1. Dizziness  R42       2. Benign paroxysmal positional vertigo of left ear  H81.12       3. Cervical pain (neck)  M54.2       4. Acute nonintractable headache, unspecified headache type  R51.9           Start Time: 0800  Stop Time: 0830  Total time in clinic (min): 30 minutes    Subjective: Pt scheduled for cervical injection on .  All vertiginous symptoms have resolved.  Pt states that she is able to complete all home/work tasks but continues to have pain/discomfort.  States cervical rehab status prior to eye surgery was about  80%, but now it feels about 50% due to feeling stressed out about the procedure.  Stiffness, tightness, and pain are aggravated since the surgery.  Cervical pain up to 7/10.  Pt goal is to be pain-free and keep up with PT for preventative measures.  Unable to perform cervical flexion or lift weights for the next week due to eye surgery.      Objective: See treatment diary below    Range of Motion measurements for the Cervical Spine     Joint Motion Right:  Left:    Flexion 50%    Extension 50%    Rotation 75% 75%   Lateral Flexion 50% 50%   Protraction 75%    Retraction 50%        UE Myotomes:  Nerve Root Test Action RIGHT  LEFT    C3 Neck side flexion intact intact   C4 Shoulder elevation intact intact   C5 Shoulder abduction intact intact   C6 Elbow Flexion and wrist extension intact intact   C7 Elbow extension and wrist flexion intact intact   C8 Thumb extension and ulnar deviation intact intact   T1 Hand intrinsics intact intact         Assessment: Pt with resolved BPPV after several tx sessions.  Continuing to address cervical dysfunction.  Pt scheduled for injection next week.  Overall, improvement noted with cervical mobility.  Pain intensity was much improved but was recently exacerbated with surgery.  Working towards long-term cervical dysfunction goals currently.  All BPPV goals have been met.  Tolerated treatment well. Patient would benefit from continued PT.  1:1 with Tommie Medina DPT entirety of tx.    Impairments: abnormal coordination, abnormal muscle firing, abnormal muscle tone, abnormal or restricted ROM, activity intolerance, impaired balance, impaired physical strength, lacks appropriate home exercise program, pain with function and safety issue  Functional limitations: transfers, bed mobility, work duties, bending, lifting, quick movements, cervical mobility  Symptom irritability: high    Initial Evaluation Assessment details: Pati Nova is a 59 y.o. female presenting with L sided  posterior canal BPPV indicative of sygns and symptoms present at initial evaluation.  Positive L [place]-Hallpike was demonstrated - canalith repositioning performed.  Primary impairments include cervical and headache pain with functional activities, deep neck flexor weakness, cervical AROM dysfunction, vestibular dysfunction.  Educated pt on anatomy and physiology of diagnosis.  Will benefit from skilled PT interventions for community reintegration, ADL management/independence, return to work/hobbies.  Provided pt with written home exercise program to be completed daily.    Understanding of Dx/Px/POC: good     Prognosis: good    Goals    Short Term Goals:  1. Negative [place]-Hallpike maneuver - MET  2. Bed mobility without onset of dizziness symptoms - MET  3. Supervision with HEP for self-care - MET  4. Decrease dizziness intensity to no more than 3/10 - MET    Long Term Goals:  1. Complete work duties for 4 consecutive hours without aggravating symptoms - ONGOING  2. DHI to greater than predicted value - MET  3. Independent with HEP for self-care - ONGOING  4. Decrease dizziness intensity to 0/10 - ONGOING  5. Decrease pain intensity to no more than 2/10 - ONGOING  6. Improve cervical mobility in all planes of motion to at least 75% - ONGOING    Plan: Continue per plan of care.  Progress treatment as tolerated.      Patient would benefit from: skilled physical therapy  Planned modality interventions: biofeedback    Planned therapy interventions: abdominal trunk stabilization, activity modification, balance, balance/weight bearing training, behavior modification, body mechanics training, community reintegration, coordination, fine motor coordination training, flexibility, functional ROM exercises, gait training, graded activity, graded exercise, graded motor, home exercise program, work reintegration, therapeutic training, therapeutic exercise, therapeutic activities, stretching, strengthening, self care, postural  "training, patient education, neuromuscular re-education, motor coordination training, massage, manual therapy, joint mobilization, ADL training and canalith repositioning    Frequency: 1-2x week  Duration in weeks: 6  Plan of Care beginning date: 12/5/2024  Plan of Care expiration date: 1/16/2025  Treatment plan discussed with: patient     Precautions: BPPV    POC expires Unit limit Auth Expiration date PT/OT + Visit Limit?   12/5/24 BOMN N/A BOMN     Visit/Unit Tracking  AUTH Status:  Date 11/13 11/18 11/20 11/25 11/27 12/5    Used 7 8 9 10 11 12    Remaining             Pertinent Findings:                                                                                            Test / Measure  10/24/2024 11/13/2024 12/5/2024   DHI 74 (severe handicap) 0 0   L [place]-Hallpike + - -   Cerv flexion AROM 50%  50%   Cerv extension AROM 25%  50%         Manuals 11/13 11/18 11/20 11/25 11/27 12/5   Canalith Repositioning         [place]-Hallpike         Roll test         C/s STM, SOR, distr, UT S MM 8' MM 8' MM 8' MM 8' MM 8' MM 8'   Neuro Re-Ed         SLS 3x30\" B blue pad 3x30\" B blue pad 3x30\" B gray pad    2x30\" B EC 3x30\" B gray pad 3x30\" B gray pad 3x30\" B gray pad   DNF supine 20x5\" 20x5\" 15x10\" 15x10\" 10x15\" 15x15\"   Wall angels         Tandem gait + foam beam 4 laps  4 laps      Open books  15x ea 15x ea 15x ea 15x ea gtb 15x ea   LTRs    15x ea     Seated T/s ext self-mobs    15x3\" 15x3\" 20x3\"   Low trap setting a wall     15x3\" 20x3\"   Ther Ex         HEP review / edu         UT/LS S 3x30\" ea B on PB 3x30\" ea B on PB 3x30\" ea B on PB   3x30\" ea B on PB   Cerv retr + ext 2x10 on PB 30x3\" on PB + gtb 30x3\" on PB + gtb      Shrugs + rot 2x10 15# + rot  10x 15#  10x 20# + rot 2x10 20# + rot 2x10 20# + rot 2x10 20# Sh rolls fwd/bwd 20x ea   SNAGS - rot 10x10\" B 10x10\" B 10x10\" B      Rich rows 2x15 15# 2x15 17# 2x15 19# 2x15 21# 2x15 22#    Rich Sh ext 2x15 14# 2x15 15# 2x15 17# 2x15 18# 2x15 19#    Facepulls  2x10 " 11# 2x10 14# 2x15 16# 2x15 17#    Ther Activity         UBE 3'/3' 3'/3' L2 3'/3' L2 3'/3' L2 3'/3' L3 4'/4' L3   [place] carry     3 laps 25# dbs + UT active S    Gait Training                           Modalities

## 2024-12-11 ENCOUNTER — OFFICE VISIT (OUTPATIENT)
Age: 59
End: 2024-12-11
Payer: COMMERCIAL

## 2024-12-11 DIAGNOSIS — H81.12 BENIGN PAROXYSMAL POSITIONAL VERTIGO OF LEFT EAR: ICD-10-CM

## 2024-12-11 DIAGNOSIS — M54.2 CERVICAL PAIN (NECK): ICD-10-CM

## 2024-12-11 DIAGNOSIS — R51.9 ACUTE NONINTRACTABLE HEADACHE, UNSPECIFIED HEADACHE TYPE: ICD-10-CM

## 2024-12-11 DIAGNOSIS — R42 DIZZINESS: Primary | ICD-10-CM

## 2024-12-11 PROCEDURE — 97112 NEUROMUSCULAR REEDUCATION: CPT

## 2024-12-11 PROCEDURE — 97110 THERAPEUTIC EXERCISES: CPT

## 2024-12-11 PROCEDURE — 97530 THERAPEUTIC ACTIVITIES: CPT

## 2024-12-11 NOTE — PROGRESS NOTES
"Daily Note     Today's date: 2024  Patient name: Pati Nova  : 1965  MRN: [id number]  Referring provider: Betty Washington CRNP  Dx:   Encounter Diagnosis     ICD-10-CM    1. Dizziness  R42       2. Benign paroxysmal positional vertigo of left ear  H81.12       3. Cervical pain (neck)  M54.2       4. Acute nonintractable headache, unspecified headache type  R51.9           Start Time: 0700  Stop Time: 0745  Total time in clinic (min): 45 minutes    Subjective: Pt reports having cervical injection tomorrow.  Feels tense in preparation for procedure.        Objective: See treatment diary below      Assessment: Tolerated treatment well. Patient would benefit from continued PT.  Pt able to tolerate progression of planned therapeutic interventions this visit.  Challenged with global spine targeted exercises.  VC's and demonstration provided with new exercises/variations.  Mild fatigue noted post-session.  Adequately challenged with current POC addressing cervical and scapular motor control.  1:1 with Tommie Medina DPT entirety of tx.        Plan: Continue per plan of care.  Progress treatment as tolerated.       Precautions: BPPV    POC expires Unit limit Auth Expiration date PT/OT + Visit Limit?   25 BOMN N/A BOMN     Visit/Unit Tracking  AUTH Status:  Date 11/20 11/25 11/27 12/5 12/11    Used 9 10 11 12 13    Remaining            Pertinent Findings:                                                                                            Test / Measure  10/24/2024 2024 2024   DHI 74 (severe handicap) 0 0   L [place]-Hallpike + - -   Cerv flexion AROM 50%  50%   Cerv extension AROM 25%  50%         Manuals    Canalith Repositioning         [place]-Hallpike         Roll test         C/s STM, SOR, distr, UT S MM 8' MM 8' MM 8' MM 8' MM 8'    Neuro Re-Ed         SLS 3x30\" B blue pad 3x30\" B gray pad    2x30\" B EC 3x30\" B gray pad 3x30\" B gray pad 3x30\" B gray " "pad 3x30\" B gray pad   DNF supine 20x5\" 15x10\" 15x10\" 10x15\" 15x15\" + rot 2x10   Wall angels         Tandem gait + foam beam  4 laps       Open books 15x ea 15x ea 15x ea 15x ea gtb 15x ea    LTRs   15x ea      Seated T/s ext self-mobs   15x3\" 15x3\" 20x3\" Prone on PB 2x10   Low trap setting a wall    15x3\" 20x3\"    [place] trunk rot      2x10 ea 10#   [place] curl      5x 5#   Ther Ex         HEP review / edu         UT/LS S 3x30\" ea B on PB 3x30\" ea B on PB   3x30\" ea B on PB    Cerv retr 30x3\" on PB + gtb 30x3\" on PB + gtb       Shrugs + rot  10x 15#  10x 20# + rot 2x10 20# + rot 2x10 20# + rot 2x10 20# Sh rolls fwd/bwd 20x ea 20x3\" 25# dbs   SNAGS - rot 10x10\" B 10x10\" B       [place] rows 2x15 17# 2x15 19# 2x15 21# 2x15 22#  2x15 22#   [place] Sh ext 2x15 15# 2x15 17# 2x15 18# 2x15 19#  2x15 19#   Facepulls 2x10 11# 2x10 14# 2x15 16# 2x15 17#  + OHP 2x10 12#   Ther Activity         UBE 3'/3' L2 3'/3' L2 3'/3' L2 3'/3' L3 4'/4' L3 3'/3' L3   [place] carry    3 laps 25# dbs + UT active S     Gait Training                           Modalities                                                      "

## 2024-12-18 ENCOUNTER — OFFICE VISIT (OUTPATIENT)
Age: 59
End: 2024-12-18
Payer: COMMERCIAL

## 2024-12-18 DIAGNOSIS — R51.9 ACUTE NONINTRACTABLE HEADACHE, UNSPECIFIED HEADACHE TYPE: ICD-10-CM

## 2024-12-18 DIAGNOSIS — M54.2 CERVICAL PAIN (NECK): ICD-10-CM

## 2024-12-18 DIAGNOSIS — H81.12 BENIGN PAROXYSMAL POSITIONAL VERTIGO OF LEFT EAR: ICD-10-CM

## 2024-12-18 DIAGNOSIS — R42 DIZZINESS: Primary | ICD-10-CM

## 2024-12-18 PROCEDURE — 97110 THERAPEUTIC EXERCISES: CPT

## 2024-12-18 PROCEDURE — 97112 NEUROMUSCULAR REEDUCATION: CPT

## 2024-12-18 PROCEDURE — 97530 THERAPEUTIC ACTIVITIES: CPT

## 2024-12-18 NOTE — PROGRESS NOTES
Daily Note     Today's date: 2024  Patient name: Pati Nova  : 1965  MRN: [id number]  Referring provider: Betty Washington CRNP  Dx:   Encounter Diagnosis     ICD-10-CM    1. Dizziness  R42       2. Benign paroxysmal positional vertigo of left ear  H81.12       3. Cervical pain (neck)  M54.2       4. Acute nonintractable headache, unspecified headache type  R51.9           Start Time: 730  Stop Time: 825  Total time in clinic (min): 55 minutes    Subjective: Pt had cervical medial branch block on .  States slight improvement of cervical symptoms.        Objective: See treatment diary below      Assessment: Tolerated treatment well. Patient would benefit from continued PT.  Pt able to tolerate continued progression of planned therapeutic interventions.  Utilized compound exercises this visit attempting to address dynamic balance and cervical/global spine motor control.  Pt with mild lightheadedness at end of tx session when attempting to complete neck bridges against the wall.  Pt discloses that she does not sweat and with increase activity can overheat which causes lightheadedness.  Utilize seated rest break to alleviate symptoms.  Mild fatigue noted post-session.  1:1 with Tommie Medina DPT entirety of tx.        Plan: Continue per plan of care.  Progress treatment as tolerated.       Precautions: BPPV    POC expires Unit limit Auth Expiration date PT/OT + Visit Limit?   25 BOMN N/A BOMN     Visit/Unit Tracking  AUTH Status:  Date     Used 9 10 11 12 13 14    Remaining             Pertinent Findings:                                                                                            Test / Measure  10/24/2024 2024 2024   DHI 74 (severe handicap) 0 0   L [place]-Hallpike + - -   Cerv flexion AROM 50%  50%   Cerv extension AROM 25%  50%         Manuals    Canalith Repositioning         [place]-Hallpike        "  Roll test         C/s STM, SOR, distr, UT S MM 8' MM 8' MM 8' MM 8'     Neuro Re-Ed         SLS 3x30\" B gray pad    2x30\" B EC 3x30\" B gray pad 3x30\" B gray pad 3x30\" B gray pad 3x30\" B gray pad Diag fwd reaches 10x ea B tap dbs   DNF supine 15x10\" 15x10\" 10x15\" 15x15\" + rot 2x10    Neck bridges at wall      10x3\"   Tandem gait + foam beam 4 laps        Open books 15x ea 15x ea 15x ea gtb 15x ea     LTRs  15x ea       Seated T/s ext self-mobs  15x3\" 15x3\" 20x3\" Prone on PB 2x10    Low trap setting a wall   15x3\" 20x3\"     [place] trunk rot     2x10 ea 10# 20x ea 11#  10x ea 13#   [place] curl     5x 5#    Ther Ex         HEP review / edu         UT/LS S 3x30\" ea B on PB   3x30\" ea B on PB     Cerv retr 30x3\" on PB + gtb        Shrugs + rot 2x10 20# + rot 2x10 20# + rot 2x10 20# Sh rolls fwd/bwd 20x ea 20x3\" 25# dbs 30x3\" 25# dbs   SNAGS - rot 10x10\" B        [place] rows 2x15 19# 2x15 21# 2x15 22#  2x15 22# + seated RDL 3x10 24#   Rich Sh ext 2x15 17# 2x15 18# 2x15 19#  2x15 19# + runners step ups 2x10 ea 11# 9\" step   Facepulls 2x10 14# 2x15 16# 2x15 17#  + OHP 2x10 12# + OHP 2x10 12#   Ther Activity         UBE 3'/3' L2 3'/3' L2 3'/3' L3 4'/4' L3 3'/3' L3 4'/4' L3   [place] carry   3 laps 25# dbs + UT active S      Gait Training                           Modalities                                                        "

## 2024-12-20 ENCOUNTER — OFFICE VISIT (OUTPATIENT)
Age: 59
End: 2024-12-20
Payer: COMMERCIAL

## 2024-12-20 DIAGNOSIS — H81.12 BENIGN PAROXYSMAL POSITIONAL VERTIGO OF LEFT EAR: ICD-10-CM

## 2024-12-20 DIAGNOSIS — R51.9 ACUTE NONINTRACTABLE HEADACHE, UNSPECIFIED HEADACHE TYPE: ICD-10-CM

## 2024-12-20 DIAGNOSIS — R42 DIZZINESS: Primary | ICD-10-CM

## 2024-12-20 DIAGNOSIS — M54.2 CERVICAL PAIN (NECK): ICD-10-CM

## 2024-12-20 PROCEDURE — 97112 NEUROMUSCULAR REEDUCATION: CPT

## 2024-12-20 PROCEDURE — 97530 THERAPEUTIC ACTIVITIES: CPT

## 2024-12-20 PROCEDURE — 97110 THERAPEUTIC EXERCISES: CPT

## 2024-12-20 NOTE — PROGRESS NOTES
"Daily Note     Today's date: 2024  Patient name: Pati Nova  : 1965  MRN: [id number]  Referring provider: Betty Washington CRNP  Dx:   Encounter Diagnosis     ICD-10-CM    1. Dizziness  R42       2. Benign paroxysmal positional vertigo of left ear  H81.12       3. Cervical pain (neck)  M54.2       4. Acute nonintractable headache, unspecified headache type  R51.9           Start Time: 739  Stop Time: 803  Total time in clinic (min): 24 minutes    Subjective: Cervical pain/dysfunction is much improved this visit, but pt noted lumbar pain is limiting.  Moderate fatigue noted following last visit.        Objective: See treatment diary below      Assessment: Tolerated treatment well. Patient would benefit from continued PT.  Tx session intended to address cervical motor control with an emphasis this visit to utilize combination exercises to decrease lumbar pain as it was limiting planned therapeutic interventions. Improvement of cervical and lumbar pain symptoms post-session.  Deep neck flexor motor control is improving.  1:1 with Tommie Medina DPT entirety of tx.        Plan: Continue per plan of care.  Progress treatment as tolerated.       Precautions: BPPV    POC expires Unit limit Auth Expiration date PT/OT + Visit Limit?   25 BOMN N/A BOMN     Visit/Unit Tracking  AUTH Status:  Date     Used 11 12 13 14 15    Remaining            Pertinent Findings:                                                                                            Test / Measure  10/24/2024 2024 2024   DHI 74 (severe handicap) 0 0   L [place] + - -   Cerv flexion AROM 50%  50%   Cerv extension AROM 25%  50%         Manuals    Canalith Repositioning         [place]         Roll test         C/s STM, SOR, distr, UT S MM 8' MM 8' MM 8'      Neuro Re-Ed         SLS 3x30\" B gray pad 3x30\" B gray pad 3x30\" B gray pad 3x30\" B gray pad Diag " "fwd reaches 10x ea B tap dbs + kb around world 15x ea B 10#   DNF supine 15x10\" 10x15\" 15x15\" + rot 2x10  + rot 2x10   Neck bridges at wall     10x3\"    Tandem gait + foam beam         Open books 15x ea 15x ea gtb 15x ea      LTRs 15x ea     + serratus punch hold 5x5 ea 5# dbs   Seated T/s ext self-mobs 15x3\" 15x3\" 20x3\" Prone on PB 2x10     Low trap setting a wall  15x3\" 20x3\"      Staples trunk rot    2x10 ea 10# 20x ea 11#  10x ea 13#    Rakan curl    5x 5#     Birddogs + cerv retr      2x10 ea   Cat-cow- child's pose      15x3\" ea   Quad full trunk rot      15x ea   Ther Ex         HEP review / edu         UT/LS S   3x30\" ea B on PB      Cerv retr         Shrugs + rot 2x10 20# + rot 2x10 20# Sh rolls fwd/bwd 20x ea 20x3\" 25# dbs 30x3\" 25# dbs    SNAGS - rot         Staples rows 2x15 21# 2x15 22#  2x15 22# + seated RDL 3x10 24# Unilat + trunk rot 2x10 ea 14#   Staples Sh ext 2x15 18# 2x15 19#  2x15 19# + runners step ups 2x10 ea 11# 9\" step    Facepulls 2x15 16# 2x15 17#  + OHP 2x10 12# + OHP 2x10 12#    Ther Activity         UBE 3'/3' L2 3'/3' L3 4'/4' L3 3'/3' L3 4'/4' L3 4'/4' L3   [place] carry  3 laps 25# dbs + UT active S       Gait Training                           Modalities                                                          "

## 2024-12-23 ENCOUNTER — OFFICE VISIT (OUTPATIENT)
Age: 59
End: 2024-12-23
Payer: COMMERCIAL

## 2024-12-23 DIAGNOSIS — H81.12 BENIGN PAROXYSMAL POSITIONAL VERTIGO OF LEFT EAR: ICD-10-CM

## 2024-12-23 DIAGNOSIS — M54.2 CERVICAL PAIN (NECK): ICD-10-CM

## 2024-12-23 DIAGNOSIS — R51.9 ACUTE NONINTRACTABLE HEADACHE, UNSPECIFIED HEADACHE TYPE: ICD-10-CM

## 2024-12-23 DIAGNOSIS — R42 DIZZINESS: Primary | ICD-10-CM

## 2024-12-23 PROCEDURE — 97530 THERAPEUTIC ACTIVITIES: CPT

## 2024-12-23 PROCEDURE — 97112 NEUROMUSCULAR REEDUCATION: CPT

## 2024-12-23 PROCEDURE — 97110 THERAPEUTIC EXERCISES: CPT

## 2024-12-23 NOTE — PROGRESS NOTES
"Daily Note     Today's date: 2024  Patient name: Pati Nova  : 1965  MRN: [id number]  Referring provider: Betty Washington CRNP  Dx:   Encounter Diagnosis     ICD-10-CM    1. Dizziness  R42       2. Benign paroxysmal positional vertigo of left ear  H81.12       3. Cervical pain (neck)  M54.2       4. Acute nonintractable headache, unspecified headache type  R51.9           Start Time: 1015  Stop Time: 1110  Total time in clinic (min): 55 minutes    Subjective: Pt reports slight improvement of cervical symptoms.  States lumbar dysfunction persists.        Objective: See treatment diary below      Assessment: Tolerated treatment well. Patient would benefit from continued PT.  Continued to utilize compound movements to address both cervical and lumbar pain symptoms in conjunction.  Challenged with STS with resistances.  Mild fatigue noted post-session.  Minimal VC's provided to complete planned therapeutic interventions correctly and safely.  1:1 with Tommie Medina DPT entirety of tx.        Plan: Continue per plan of care.  Progress treatment as tolerated.       Precautions: BPPV    POC expires Unit limit Auth Expiration date PT/OT + Visit Limit?   25 BOMN N/A BOMN     Visit/Unit Tracking  AUTH Status:  Date     Used 11  13 14 15 16    Remaining             Pertinent Findings:                                                                                            Test / Measure  10/24/2024 2024 2024   DHI 74 (severe handicap) 0 0   L [place] + - -   Cerv flexion AROM 50%  50%   Cerv extension AROM 25%  50%         Manuals    Canalith Repositioning         [place]         Roll test         C/s STM, SOR, distr, UT S MM 8' MM 8'       Neuro Re-Ed         SLS 3x30\" B gray pad 3x30\" B gray pad 3x30\" B gray pad Diag fwd reaches 10x ea B tap dbs + kb around world 15x ea B 10# Runner's step ups bosu 2x10 B " "  DNF supine 10x15\" 15x15\" + rot 2x10  + rot 2x10    Neck bridges at wall    10x3\"     Tandem gait + foam beam         Open books 15x ea gtb 15x ea       LTRs     + serratus punch hold 5x5 ea 5# dbs    Seated T/s ext self-mobs 15x3\" 20x3\" Prone on PB 2x10      Low trap setting a wall 15x3\" 20x3\"       Rich trunk rot   2x10 ea 10# 20x ea 11#  10x ea 13#     Rakan curl   5x 5#      Birddogs + cerv retr     2x10 ea 2x10 ea   Cat-cow- child's pose     15x3\" ea 15x3\" ea   Quad full trunk rot     15x ea 15x ea   [place] press      2x10 ea 9#   Ther Ex         HEP review / edu         UT/LS S  3x30\" ea B on PB       Cerv retr         Shrugs + rot 2x10 20# Sh rolls fwd/bwd 20x ea 20x3\" 25# dbs 30x3\" 25# dbs  + STS 2x10 10# dbs   SNAGS - rot         Rich rows 2x15 22#  2x15 22# + seated RDL 3x10 24# Unilat + trunk rot 2x10 ea 14#    [place] Sh ext 2x15 19#  2x15 19# + runners step ups 2x10 ea 11# 9\" step     Facepulls 2x15 17#  + OHP 2x10 12# + OHP 2x10 12#     Ther Activity         UBE 3'/3' L3 4'/4' L3 3'/3' L3 4'/4' L3 4'/4' L3 4'/4' L3   [place] carry 3 laps 25# dbs + UT active S     [place] carry 3 laps ea 20#   Gait Training                           Modalities                                                            "

## 2024-12-24 ENCOUNTER — APPOINTMENT (OUTPATIENT)
Age: 59
End: 2024-12-24
Payer: COMMERCIAL

## 2024-12-27 ENCOUNTER — APPOINTMENT (OUTPATIENT)
Age: 59
End: 2024-12-27
Payer: COMMERCIAL